# Patient Record
Sex: FEMALE | Race: WHITE | Employment: UNEMPLOYED | URBAN - METROPOLITAN AREA
[De-identification: names, ages, dates, MRNs, and addresses within clinical notes are randomized per-mention and may not be internally consistent; named-entity substitution may affect disease eponyms.]

---

## 2017-01-26 ENCOUNTER — OFFICE VISIT (OUTPATIENT)
Dept: INTERNAL MEDICINE CLINIC | Age: 52
End: 2017-01-26

## 2017-01-26 ENCOUNTER — HOSPITAL ENCOUNTER (OUTPATIENT)
Dept: OTHER | Age: 52
Discharge: HOME | End: 2017-01-26
Attending: INTERNAL MEDICINE | Admitting: INTERNAL MEDICINE

## 2017-01-26 VITALS
HEART RATE: 80 BPM | SYSTOLIC BLOOD PRESSURE: 130 MMHG | DIASTOLIC BLOOD PRESSURE: 82 MMHG | OXYGEN SATURATION: 99 % | BODY MASS INDEX: 35.99 KG/M2 | WEIGHT: 243 LBS | HEIGHT: 69 IN

## 2017-01-26 DIAGNOSIS — R06.02 SHORTNESS OF BREATH: Primary | ICD-10-CM

## 2017-01-26 DIAGNOSIS — R06.02 SHORTNESS OF BREATH: ICD-10-CM

## 2017-01-26 LAB
ANION GAP SERPL CALCULATED.3IONS-SCNC: 18 MMOL/L (ref 3–16)
BASOPHILS ABSOLUTE: 0 K/UL (ref 0–0.2)
BASOPHILS RELATIVE PERCENT: 0.5 %
BUN BLDV-MCNC: 12 MG/DL (ref 7–20)
CALCIUM SERPL-MCNC: 9.5 MG/DL (ref 8.3–10.6)
CHLORIDE BLD-SCNC: 96 MMOL/L (ref 99–110)
CO2: 25 MMOL/L (ref 21–32)
CREAT SERPL-MCNC: 0.6 MG/DL (ref 0.6–1.1)
D DIMER: <200 NG/ML DDU (ref 0–229)
EOSINOPHILS ABSOLUTE: 0.2 K/UL (ref 0–0.6)
EOSINOPHILS RELATIVE PERCENT: 2 %
GFR AFRICAN AMERICAN: >60
GFR NON-AFRICAN AMERICAN: >60
GLUCOSE BLD-MCNC: 83 MG/DL (ref 70–99)
HCT VFR BLD CALC: 37.7 % (ref 36–48)
HEMOGLOBIN: 12.5 G/DL (ref 12–16)
LYMPHOCYTES ABSOLUTE: 2.2 K/UL (ref 1–5.1)
LYMPHOCYTES RELATIVE PERCENT: 29 %
MCH RBC QN AUTO: 29.6 PG (ref 26–34)
MCHC RBC AUTO-ENTMCNC: 33.1 G/DL (ref 31–36)
MCV RBC AUTO: 89.5 FL (ref 80–100)
MONOCYTES ABSOLUTE: 0.4 K/UL (ref 0–1.3)
MONOCYTES RELATIVE PERCENT: 5.3 %
NEUTROPHILS ABSOLUTE: 4.9 K/UL (ref 1.7–7.7)
NEUTROPHILS RELATIVE PERCENT: 63.2 %
PDW BLD-RTO: 12.8 % (ref 12.4–15.4)
PLATELET # BLD: 318 K/UL (ref 135–450)
PMV BLD AUTO: 8.3 FL (ref 5–10.5)
POTASSIUM SERPL-SCNC: 4.1 MMOL/L (ref 3.5–5.1)
RBC # BLD: 4.21 M/UL (ref 4–5.2)
SODIUM BLD-SCNC: 139 MMOL/L (ref 136–145)
WBC # BLD: 7.7 K/UL (ref 4–11)

## 2017-01-26 PROCEDURE — 1036F TOBACCO NON-USER: CPT | Performed by: INTERNAL MEDICINE

## 2017-01-26 PROCEDURE — 99213 OFFICE O/P EST LOW 20 MIN: CPT | Performed by: INTERNAL MEDICINE

## 2017-01-26 PROCEDURE — 3014F SCREEN MAMMO DOC REV: CPT | Performed by: INTERNAL MEDICINE

## 2017-01-26 PROCEDURE — G8427 DOCREV CUR MEDS BY ELIG CLIN: HCPCS | Performed by: INTERNAL MEDICINE

## 2017-01-26 PROCEDURE — 93000 ELECTROCARDIOGRAM COMPLETE: CPT | Performed by: INTERNAL MEDICINE

## 2017-01-26 PROCEDURE — G8419 CALC BMI OUT NRM PARAM NOF/U: HCPCS | Performed by: INTERNAL MEDICINE

## 2017-01-26 PROCEDURE — G8484 FLU IMMUNIZE NO ADMIN: HCPCS | Performed by: INTERNAL MEDICINE

## 2017-01-26 RX ORDER — LOSARTAN POTASSIUM 50 MG/1
TABLET ORAL
Qty: 30 TABLET | Refills: 3 | Status: CANCELLED | OUTPATIENT
Start: 2017-01-26

## 2017-01-26 ASSESSMENT — ENCOUNTER SYMPTOMS
BLOOD IN STOOL: 0
VOMITING: 0
DIARRHEA: 0
CHEST TIGHTNESS: 0
WHEEZING: 0
ABDOMINAL PAIN: 0
NAUSEA: 0
COUGH: 0
SHORTNESS OF BREATH: 1

## 2017-02-06 RX ORDER — LOSARTAN POTASSIUM 50 MG/1
TABLET ORAL
Qty: 30 TABLET | Refills: 2 | Status: SHIPPED | OUTPATIENT
Start: 2017-02-06 | End: 2017-05-14 | Stop reason: SDUPTHER

## 2017-04-24 ENCOUNTER — OFFICE VISIT (OUTPATIENT)
Dept: INTERNAL MEDICINE CLINIC | Age: 52
End: 2017-04-24

## 2017-04-24 ENCOUNTER — HOSPITAL ENCOUNTER (OUTPATIENT)
Dept: OTHER | Age: 52
Discharge: HOME | End: 2017-04-24
Attending: NURSE PRACTITIONER | Admitting: NURSE PRACTITIONER

## 2017-04-24 VITALS
SYSTOLIC BLOOD PRESSURE: 138 MMHG | HEIGHT: 69 IN | BODY MASS INDEX: 36.73 KG/M2 | WEIGHT: 248 LBS | DIASTOLIC BLOOD PRESSURE: 80 MMHG | RESPIRATION RATE: 14 BRPM | HEART RATE: 80 BPM

## 2017-04-24 DIAGNOSIS — M79.604 LOWER EXTREMITY PAIN, RIGHT: Primary | ICD-10-CM

## 2017-04-24 LAB
ANION GAP SERPL CALCULATED.3IONS-SCNC: 11 MMOL/L (ref 3–16)
BUN BLDV-MCNC: 12 MG/DL (ref 7–20)
CALCIUM SERPL-MCNC: 9.4 MG/DL (ref 8.3–10.6)
CHLORIDE BLD-SCNC: 98 MMOL/L (ref 99–110)
CO2: 30 MMOL/L (ref 21–32)
CREAT SERPL-MCNC: 0.7 MG/DL (ref 0.6–1.1)
D DIMER: <200 NG/ML DDU (ref 0–229)
GFR AFRICAN AMERICAN: >60
GFR NON-AFRICAN AMERICAN: >60
GLUCOSE BLD-MCNC: 118 MG/DL (ref 70–99)
POTASSIUM SERPL-SCNC: 3.7 MMOL/L (ref 3.5–5.1)
SODIUM BLD-SCNC: 139 MMOL/L (ref 136–145)

## 2017-04-24 PROCEDURE — G8417 CALC BMI ABV UP PARAM F/U: HCPCS | Performed by: NURSE PRACTITIONER

## 2017-04-24 PROCEDURE — G8427 DOCREV CUR MEDS BY ELIG CLIN: HCPCS | Performed by: NURSE PRACTITIONER

## 2017-04-24 PROCEDURE — 3014F SCREEN MAMMO DOC REV: CPT | Performed by: NURSE PRACTITIONER

## 2017-04-24 PROCEDURE — 3017F COLORECTAL CA SCREEN DOC REV: CPT | Performed by: NURSE PRACTITIONER

## 2017-04-24 PROCEDURE — 1036F TOBACCO NON-USER: CPT | Performed by: NURSE PRACTITIONER

## 2017-04-24 PROCEDURE — 99214 OFFICE O/P EST MOD 30 MIN: CPT | Performed by: NURSE PRACTITIONER

## 2017-04-24 ASSESSMENT — ENCOUNTER SYMPTOMS
CHEST TIGHTNESS: 0
VOMITING: 0
NAUSEA: 0
COUGH: 0
RHINORRHEA: 0
ABDOMINAL PAIN: 0
SHORTNESS OF BREATH: 1
DIARRHEA: 0

## 2017-06-19 RX ORDER — LOSARTAN POTASSIUM 50 MG/1
TABLET ORAL
Qty: 30 TABLET | Refills: 0 | Status: SHIPPED | OUTPATIENT
Start: 2017-06-19 | End: 2017-07-19 | Stop reason: SDUPTHER

## 2017-07-19 RX ORDER — LOSARTAN POTASSIUM 50 MG/1
TABLET ORAL
Qty: 30 TABLET | Refills: 0 | Status: SHIPPED | OUTPATIENT
Start: 2017-07-19 | End: 2017-07-26 | Stop reason: SDUPTHER

## 2017-07-26 ENCOUNTER — OFFICE VISIT (OUTPATIENT)
Dept: INTERNAL MEDICINE CLINIC | Age: 52
End: 2017-07-26

## 2017-07-26 VITALS
HEART RATE: 80 BPM | HEIGHT: 69 IN | BODY MASS INDEX: 37.03 KG/M2 | WEIGHT: 250 LBS | DIASTOLIC BLOOD PRESSURE: 80 MMHG | SYSTOLIC BLOOD PRESSURE: 132 MMHG

## 2017-07-26 DIAGNOSIS — E66.9 NON MORBID OBESITY, UNSPECIFIED OBESITY TYPE: ICD-10-CM

## 2017-07-26 DIAGNOSIS — R06.02 SHORTNESS OF BREATH: ICD-10-CM

## 2017-07-26 DIAGNOSIS — I10 ESSENTIAL HYPERTENSION: Primary | ICD-10-CM

## 2017-07-26 DIAGNOSIS — M79.89 RIGHT LEG SWELLING: ICD-10-CM

## 2017-07-26 PROCEDURE — 3014F SCREEN MAMMO DOC REV: CPT | Performed by: INTERNAL MEDICINE

## 2017-07-26 PROCEDURE — G8427 DOCREV CUR MEDS BY ELIG CLIN: HCPCS | Performed by: INTERNAL MEDICINE

## 2017-07-26 PROCEDURE — 1036F TOBACCO NON-USER: CPT | Performed by: INTERNAL MEDICINE

## 2017-07-26 PROCEDURE — 99214 OFFICE O/P EST MOD 30 MIN: CPT | Performed by: INTERNAL MEDICINE

## 2017-07-26 PROCEDURE — G8417 CALC BMI ABV UP PARAM F/U: HCPCS | Performed by: INTERNAL MEDICINE

## 2017-07-26 PROCEDURE — 3017F COLORECTAL CA SCREEN DOC REV: CPT | Performed by: INTERNAL MEDICINE

## 2017-07-26 RX ORDER — LOSARTAN POTASSIUM 50 MG/1
TABLET ORAL
Qty: 30 TABLET | Refills: 2 | Status: SHIPPED | OUTPATIENT
Start: 2017-07-26 | End: 2017-11-21 | Stop reason: SDUPTHER

## 2017-07-26 ASSESSMENT — ENCOUNTER SYMPTOMS
CHEST TIGHTNESS: 0
COUGH: 0
WHEEZING: 0
SHORTNESS OF BREATH: 0
VOMITING: 0
ABDOMINAL PAIN: 0
BLOOD IN STOOL: 0
DIARRHEA: 0
NAUSEA: 0

## 2017-08-03 ENCOUNTER — HOSPITAL ENCOUNTER (OUTPATIENT)
Dept: ULTRASOUND IMAGING | Age: 52
Discharge: HOME | End: 2017-08-03
Attending: INTERNAL MEDICINE | Admitting: INTERNAL MEDICINE

## 2017-08-03 DIAGNOSIS — M79.89 RIGHT LEG SWELLING: ICD-10-CM

## 2017-12-13 ENCOUNTER — OFFICE VISIT (OUTPATIENT)
Dept: INTERNAL MEDICINE CLINIC | Age: 52
End: 2017-12-13

## 2017-12-13 VITALS
SYSTOLIC BLOOD PRESSURE: 140 MMHG | HEIGHT: 69 IN | BODY MASS INDEX: 37.18 KG/M2 | HEART RATE: 76 BPM | WEIGHT: 251 LBS | DIASTOLIC BLOOD PRESSURE: 82 MMHG

## 2017-12-13 DIAGNOSIS — I10 ESSENTIAL HYPERTENSION: Primary | ICD-10-CM

## 2017-12-13 DIAGNOSIS — R09.89 BRUIT OF LEFT CAROTID ARTERY: ICD-10-CM

## 2017-12-13 PROCEDURE — 1036F TOBACCO NON-USER: CPT | Performed by: INTERNAL MEDICINE

## 2017-12-13 PROCEDURE — 3017F COLORECTAL CA SCREEN DOC REV: CPT | Performed by: INTERNAL MEDICINE

## 2017-12-13 PROCEDURE — G8484 FLU IMMUNIZE NO ADMIN: HCPCS | Performed by: INTERNAL MEDICINE

## 2017-12-13 PROCEDURE — G8427 DOCREV CUR MEDS BY ELIG CLIN: HCPCS | Performed by: INTERNAL MEDICINE

## 2017-12-13 PROCEDURE — 3014F SCREEN MAMMO DOC REV: CPT | Performed by: INTERNAL MEDICINE

## 2017-12-13 PROCEDURE — 99213 OFFICE O/P EST LOW 20 MIN: CPT | Performed by: INTERNAL MEDICINE

## 2017-12-13 PROCEDURE — G8417 CALC BMI ABV UP PARAM F/U: HCPCS | Performed by: INTERNAL MEDICINE

## 2017-12-13 ASSESSMENT — ENCOUNTER SYMPTOMS
DIARRHEA: 0
WHEEZING: 0
NAUSEA: 0
COUGH: 0
ABDOMINAL PAIN: 0
SHORTNESS OF BREATH: 0
CHEST TIGHTNESS: 0
BLOOD IN STOOL: 0
VOMITING: 0

## 2017-12-18 RX ORDER — LOSARTAN POTASSIUM 50 MG/1
TABLET ORAL
Qty: 30 TABLET | Refills: 0 | Status: SHIPPED | OUTPATIENT
Start: 2017-12-18 | End: 2018-01-05 | Stop reason: SDUPTHER

## 2017-12-20 ENCOUNTER — HOSPITAL ENCOUNTER (OUTPATIENT)
Dept: ULTRASOUND IMAGING | Age: 52
Discharge: HOME | End: 2017-12-20
Admitting: INTERNAL MEDICINE

## 2017-12-20 DIAGNOSIS — R09.89 OTHER SPECIFIED SYMPTOMS AND SIGNS INVOLVING THE CIRCULATORY AND RESPIRATORY SYSTEMS: ICD-10-CM

## 2017-12-20 DIAGNOSIS — R09.89 BRUIT OF LEFT CAROTID ARTERY: ICD-10-CM

## 2018-01-05 ENCOUNTER — OFFICE VISIT (OUTPATIENT)
Dept: INTERNAL MEDICINE CLINIC | Age: 53
End: 2018-01-05

## 2018-01-05 VITALS
WEIGHT: 250 LBS | DIASTOLIC BLOOD PRESSURE: 84 MMHG | SYSTOLIC BLOOD PRESSURE: 136 MMHG | BODY MASS INDEX: 37.03 KG/M2 | HEIGHT: 69 IN | HEART RATE: 72 BPM

## 2018-01-05 DIAGNOSIS — E66.9 NON MORBID OBESITY: ICD-10-CM

## 2018-01-05 DIAGNOSIS — I10 ESSENTIAL HYPERTENSION: Primary | ICD-10-CM

## 2018-01-05 PROCEDURE — 3014F SCREEN MAMMO DOC REV: CPT | Performed by: INTERNAL MEDICINE

## 2018-01-05 PROCEDURE — 1036F TOBACCO NON-USER: CPT | Performed by: INTERNAL MEDICINE

## 2018-01-05 PROCEDURE — G8417 CALC BMI ABV UP PARAM F/U: HCPCS | Performed by: INTERNAL MEDICINE

## 2018-01-05 PROCEDURE — G8427 DOCREV CUR MEDS BY ELIG CLIN: HCPCS | Performed by: INTERNAL MEDICINE

## 2018-01-05 PROCEDURE — 3017F COLORECTAL CA SCREEN DOC REV: CPT | Performed by: INTERNAL MEDICINE

## 2018-01-05 PROCEDURE — 99213 OFFICE O/P EST LOW 20 MIN: CPT | Performed by: INTERNAL MEDICINE

## 2018-01-05 PROCEDURE — G8484 FLU IMMUNIZE NO ADMIN: HCPCS | Performed by: INTERNAL MEDICINE

## 2018-01-05 RX ORDER — LOSARTAN POTASSIUM 50 MG/1
TABLET ORAL
Qty: 30 TABLET | Refills: 3 | Status: SHIPPED | OUTPATIENT
Start: 2018-01-05 | End: 2018-05-04 | Stop reason: SDUPTHER

## 2018-01-05 ASSESSMENT — ENCOUNTER SYMPTOMS
ABDOMINAL PAIN: 0
NAUSEA: 0
COUGH: 0
VOMITING: 0
SHORTNESS OF BREATH: 0
BLOOD IN STOOL: 0
CHEST TIGHTNESS: 0
WHEEZING: 0
DIARRHEA: 0

## 2018-03-06 ENCOUNTER — HOSPITAL ENCOUNTER (OUTPATIENT)
Dept: OTHER | Age: 53
Discharge: HOME | End: 2018-03-06
Attending: INTERNAL MEDICINE | Admitting: INTERNAL MEDICINE

## 2018-03-06 VITALS
TEMPERATURE: 97.5 F | OXYGEN SATURATION: 98 % | HEART RATE: 69 BPM | DIASTOLIC BLOOD PRESSURE: 79 MMHG | BODY MASS INDEX: 37.03 KG/M2 | SYSTOLIC BLOOD PRESSURE: 137 MMHG | WEIGHT: 250 LBS | HEIGHT: 69 IN | RESPIRATION RATE: 17 BRPM

## 2018-03-06 LAB — PREGNANCY, URINE: NEGATIVE

## 2018-03-06 RX ORDER — SODIUM CHLORIDE, SODIUM LACTATE, POTASSIUM CHLORIDE, CALCIUM CHLORIDE 600; 310; 30; 20 MG/100ML; MG/100ML; MG/100ML; MG/100ML
INJECTION, SOLUTION INTRAVENOUS ONCE
Status: COMPLETED | OUTPATIENT
Start: 2018-03-06 | End: 2018-03-06

## 2018-03-06 RX ADMIN — SODIUM CHLORIDE, SODIUM LACTATE, POTASSIUM CHLORIDE, CALCIUM CHLORIDE: 600; 310; 30; 20 INJECTION, SOLUTION INTRAVENOUS at 07:43

## 2018-03-06 NOTE — PLAN OF CARE
ENDOSCOPY  PRE, INTRA, & POST PROCEDURAL  CARE PLAN    HEMODYNAMIC STATUS  INTERDISCIPLINARY   Goal: Hemodynamic Status to Baseline / Discharge Criteria Met  Interventions     1. Obtain Patient  Medical /  Surgical History     1 Assess & Review Allergies Prior to Endo & All  Meds (PRN)     2. Assess / Monitor Vital Signs / LOC (PRN). Intra Procedure VS/ LOC  Q5 Mins  & As Per Policy Until Discharge. 3. Obtain Baseline Lucas & Post Procedural  Lucas Per   Policy     4. Check Monitors, Alarms On     5. Patient Re Assessed by Physician     6. Monitor  I&O Post Procedure   NURSING   SAFETY & PSYCHO SOCIAL  INTERDISCIPLINARY   Goal: Patient Returns to Baseline Activity/ Meets Discharge Criteria  Interventions     1. Greet Patient with ID Badge/ Picture in View (PRN)     2. Be Available & Sensitive to Patients Needs (PRN)     3. Communicate referral to Pastoral Care as Appropriate (PRN)     4. Provide Age Specific Measures (PRN)     4. Admission Data Base Reviewed     5. Administer Meds Per Orders (PRN)     5. Maintain Baseline Activity  Or Activity as Ordered Per Physician     NUTRITION   INTERDISCIPLINARY   Goal: Patient to baseline/ Improved Nutrition  Interventions     1. Assess NPO Status / Notify Physician if Necessary (PRN)     2. NPO per Physician Orders     3. Assess Nutritional Status (PRN)     4. Assess Ability to Swallow as Indicated     LAB & DIAGNOSTICS   Goal: Additional Tests per Physicians   Orders  Interventions     1. Lab & Diagnostics per Physician Orders (PRN)     2.  Obtain  Urine / Serum HCG & FSBS On All Diabetic Patients  Per Policy & (PRN)     3. Assess Lab Time Out  for  Patient Safety as Needed (PRN)   RESPIRATORY  INTERDISCIPLINARY   Goal: Airway   Patency, SAO2   Saturation, Cough mechanism Maintained   Interventions     1. Evaluate Bilateral Breath Sounds  Baseline, (PRN), & Prior to Discharge     2. Weight & Height Noted ( PRN)     3.   Assess Baseline SPo2 (PRN)

## 2018-03-06 NOTE — OP NOTE
Alycia Pérez   3/6/2018  Colonoscopy  A pre-procedure re-evaluation was performed immediately prior to the procedure.   Preprocedure Dx: screening  Postprocedure Dx: normal  Medications: Procedural sedation with Versed & Fentanyl  Complications: None  Estimated Blood Loss: <5cc  Specimens: were obtained  Recommendations: await path  Bart Patel

## 2018-03-27 ENCOUNTER — OFFICE VISIT (OUTPATIENT)
Dept: INTERNAL MEDICINE CLINIC | Age: 53
End: 2018-03-27

## 2018-03-27 VITALS
WEIGHT: 245 LBS | DIASTOLIC BLOOD PRESSURE: 75 MMHG | HEART RATE: 72 BPM | SYSTOLIC BLOOD PRESSURE: 135 MMHG | HEIGHT: 69 IN | BODY MASS INDEX: 36.29 KG/M2 | TEMPERATURE: 97.8 F

## 2018-03-27 DIAGNOSIS — J20.9 ACUTE BRONCHITIS, UNSPECIFIED ORGANISM: Primary | ICD-10-CM

## 2018-03-27 PROCEDURE — G8484 FLU IMMUNIZE NO ADMIN: HCPCS | Performed by: PHYSICIAN ASSISTANT

## 2018-03-27 PROCEDURE — 1036F TOBACCO NON-USER: CPT | Performed by: PHYSICIAN ASSISTANT

## 2018-03-27 PROCEDURE — 99213 OFFICE O/P EST LOW 20 MIN: CPT | Performed by: PHYSICIAN ASSISTANT

## 2018-03-27 PROCEDURE — G8417 CALC BMI ABV UP PARAM F/U: HCPCS | Performed by: PHYSICIAN ASSISTANT

## 2018-03-27 PROCEDURE — G8427 DOCREV CUR MEDS BY ELIG CLIN: HCPCS | Performed by: PHYSICIAN ASSISTANT

## 2018-03-27 PROCEDURE — 3017F COLORECTAL CA SCREEN DOC REV: CPT | Performed by: PHYSICIAN ASSISTANT

## 2018-03-27 PROCEDURE — 3014F SCREEN MAMMO DOC REV: CPT | Performed by: PHYSICIAN ASSISTANT

## 2018-03-27 RX ORDER — DOXYCYCLINE HYCLATE 100 MG
100 TABLET ORAL 2 TIMES DAILY
Qty: 10 TABLET | Refills: 0 | Status: SHIPPED | OUTPATIENT
Start: 2018-03-27 | End: 2018-04-01

## 2018-03-27 ASSESSMENT — ENCOUNTER SYMPTOMS
SORE THROAT: 0
NAUSEA: 0
ABDOMINAL PAIN: 0
COUGH: 1
SHORTNESS OF BREATH: 0
RHINORRHEA: 1
VOMITING: 0

## 2018-03-27 NOTE — PROGRESS NOTES
Chief Complaint   Patient presents with    Cough        HPI  Lalo Henry is a 47 y.o. female who presents for evaluation of cough. She had influenza A at the beginning of March. Acute flu symptoms resolved but she has had a cough for about 3 weeks. The cough is not productive. She has tried nyquil OTC. No current fevers or chills     Nonsmoker. No CP and no SOB      Review of Systems   Constitutional: Negative for chills and fever. HENT: Positive for congestion, postnasal drip and rhinorrhea. Negative for sore throat. Respiratory: Positive for cough. Negative for shortness of breath. Cardiovascular: Negative for chest pain. Gastrointestinal: Negative for abdominal pain, nausea and vomiting. Allergies  Codeine      Vitals  /75   Pulse 72   Temp 97.8 °F (36.6 °C)   Ht 5' 9\" (1.753 m)   Wt 245 lb (111.1 kg)   LMP 12/06/2017   BMI 36.18 kg/m²     Current Medications  Current Outpatient Prescriptions   Medication Sig Dispense Refill    doxycycline hyclate (VIBRA-TABS) 100 MG tablet Take 1 tablet by mouth 2 times daily for 5 days 10 tablet 0    losartan (COZAAR) 50 MG tablet TAKE ONE TABLET BY MOUTH DAILY 30 tablet 3     No current facility-administered medications for this visit. Past Medical History  Past Medical History:   Diagnosis Date    Bursitis     colonoscopy 03/06/2018    Dyspnea     GERD (gastroesophageal reflux disease)     Hypertension        Social History  Social History     Social History    Marital status:      Spouse name: N/A    Number of children: N/A    Years of education: N/A     Occupational History    Not on file.      Social History Main Topics    Smoking status: Never Smoker    Smokeless tobacco: Never Used    Alcohol use No    Drug use: No    Sexual activity: Not on file     Other Topics Concern    Not on file     Social History Narrative    No narrative on file       Surgical History  Past Surgical History:   Procedure Laterality Date    CHOLECYSTECTOMY      COLONOSCOPY  03/06/2018       Physical Exam   Constitutional: She is oriented to person, place, and time. She appears well-developed and well-nourished. HENT:   Head: Normocephalic and atraumatic. Eyes: Conjunctivae are normal. Right eye exhibits no discharge. Left eye exhibits no discharge. Neck: Normal range of motion. Neck supple. Cardiovascular: Normal rate and regular rhythm. Pulmonary/Chest: Effort normal and breath sounds normal.   Musculoskeletal: Normal range of motion. Neurological: She is alert and oriented to person, place, and time. Skin: Skin is warm and dry. Psychiatric: She has a normal mood and affect. Vitals reviewed. Assessment/Plan     1. Acute bronchitis, unspecified organism  - doxycycline hyclate (VIBRA-TABS) 100 MG tablet; Take 1 tablet by mouth 2 times daily for 5 days  Dispense: 10 tablet;  Refill: 0  - mucinex  - call if no improvement     Liliana Hurtado PA-C  3/27/2018

## 2018-03-27 NOTE — PATIENT INSTRUCTIONS
discoloration later in the baby's life. What is doxycycline? Doxycycline is a tetracycline antibiotic that fights bacteria in the body. Doxycycline is used to treat many different bacterial infections, such as acne, urinary tract infections, intestinal infections, eye infections, gonorrhea, chlamydia, periodontitis (gum disease), and others. Doxycycline is also used to treat blemishes, bumps, and acne-like lesions caused by rosacea. Doxycycline will not treat facial redness caused by rosacea. Some forms of doxycycline are used to prevent malaria, to treat anthrax, or to treat infections caused by mites, ticks, or lice. Doxycycline may also be used for purposes not listed in this medication guide. What should I discuss with my healthcare provider before taking doxycycline? You should not take this medicine if you are allergic to doxycycline or other tetracycline antibiotics such as demeclocycline, minocycline, tetracycline, or tigecycline. To make sure doxycycline is safe for you, tell your doctor if you have:  · liver disease;  · kidney disease;  · asthma or sulfite allergy;  · a history of increased pressure inside your skull;  · if you also take isotretinoin (Amnesteem, Claravis, Sotret); or  · if you take seizure medicine (carbamazepine, phenobarbital, phenytoin), or a blood thinner (warfarin, Coumadin, Roby Fair). If you are using doxycycline to treat gonorrhea, your doctor may test you to make sure you do not also have syphilis, another sexually transmitted disease. Taking this medicine during pregnancy may affect tooth and bone development in the unborn baby. Taking doxycycline during the last half of pregnancy can cause permanent tooth discoloration later in the baby's life. Tell your doctor if you are pregnant or if you become pregnant while using this medicine. Doxycycline can make birth control pills less effective.  Ask your doctor about using a non-hormonal birth control (condom, diaphragm with spermicide) to prevent pregnancy. Doxycycline can pass into breast milk and may affect bone and tooth development in a nursing infant. Do not breast-feed while you are taking doxycycline. Doxycycline can cause permanent yellowing or graying of the teeth in children younger than 6years old. Children should use doxycycline only in cases of severe or life-threatening conditions such as anthrax or [facility] spotted fever. The benefit of treating a serious condition may outweigh any risks to the child's tooth development. How should I take doxycycline? Follow all directions on your prescription label. Do not take this medicine in larger or smaller amounts or for longer than recommended. Take doxycycline with a full glass of water. Drink plenty of liquids while you are taking doxycycline. Most brands of doxycyline may be taken with food or milk if the medicine upsets your stomach. Different brands of doxycycline may have different instructions about taking them with or without food. Take Oracea on an empty stomach, at least 1 hour before or 2 hours after a meal.  You may open a Doryx capsule or break up a Doryx tablet and sprinkle the medicine into a spoonful of applesauce to make swallowing easier. Swallow right away without chewing. Do not save the mixture for later use. Drink a full glass (8 ounces) of cool water right away. Do not crush, break, or open a delayed-release capsule or tablet. Swallow the pill whole. You may need to split the Acticlate tablet to get the correct dose. The tablet is scored so you can break it apart easily. Measure liquid medicine  with the dosing syringe provided, or with a special dose-measuring spoon or medicine cup. If you do not have a dose-measuring device, ask your pharmacist for one. If you take doxycycline to prevent malaria: Start taking the medicine 1 or 2 days before entering an area where malaria is common.  Continue taking the medicine every day during your medication only for the indication prescribed. Every effort has been made to ensure that the information provided by [address] is accurate, up-to-date, and complete, but no guarantee is made to that effect. Drug information contained herein may be time sensitive. [facility] information has been compiled for use by healthcare practitioners and consumers in the [place] and therefore [facility] does not warrant that uses outside of the [place] are appropriate, unless specifically indicated otherwise. [facility]'s drug information does not endorse drugs, diagnose patients or recommend therapy. [facility] drug information is an informational resource designed to assist licensed healthcare practitioners in caring for their patients and/or to serve consumers viewing this service as a supplement to, and not a substitute for, the expertise, skill, knowledge and judgment of healthcare practitioners. The absence of a warning for a given drug or drug combination in no way should be construed to indicate that the drug or drug combination is safe, effective or appropriate for any given patient. [facility] does not assume any responsibility for any aspect of healthcare administered with the aid of information [facility] provides. The information contained herein is not intended to cover all possible uses, directions, precautions, warnings, drug interactions, allergic reactions, or adverse effects. If you have questions about the drugs you are taking, check with your doctor, nurse or pharmacist.  Copyright 3697-5388 [address]. Version: 19.11. Revision date: 1/4/2017. Care instructions adapted under license by [facility] ([facility]). If you have questions about a medical condition or this instruction, always ask your healthcare professional. [place] [address] any warranty or liability for your use of this information.

## 2018-05-04 ENCOUNTER — OFFICE VISIT (OUTPATIENT)
Dept: INTERNAL MEDICINE CLINIC | Age: 53
End: 2018-05-04

## 2018-05-04 VITALS
WEIGHT: 242 LBS | SYSTOLIC BLOOD PRESSURE: 130 MMHG | HEART RATE: 76 BPM | DIASTOLIC BLOOD PRESSURE: 82 MMHG | BODY MASS INDEX: 35.84 KG/M2 | HEIGHT: 69 IN

## 2018-05-04 DIAGNOSIS — Z00.00 ROUTINE GENERAL MEDICAL EXAMINATION AT A HEALTH CARE FACILITY: ICD-10-CM

## 2018-05-04 DIAGNOSIS — Z00.00 ROUTINE GENERAL MEDICAL EXAMINATION AT A HEALTH CARE FACILITY: Primary | ICD-10-CM

## 2018-05-04 DIAGNOSIS — E66.9 NON MORBID OBESITY: ICD-10-CM

## 2018-05-04 DIAGNOSIS — I10 ESSENTIAL HYPERTENSION: ICD-10-CM

## 2018-05-04 DIAGNOSIS — Z12.31 SCREENING MAMMOGRAM, ENCOUNTER FOR: ICD-10-CM

## 2018-05-04 LAB
A/G RATIO: 1.5 (ref 1.1–2.2)
ALBUMIN SERPL-MCNC: 4.3 G/DL (ref 3.4–5)
ALP BLD-CCNC: 106 U/L (ref 40–129)
ALT SERPL-CCNC: 17 U/L (ref 10–40)
ANION GAP SERPL CALCULATED.3IONS-SCNC: 16 MMOL/L (ref 3–16)
AST SERPL-CCNC: 17 U/L (ref 15–37)
BASOPHILS ABSOLUTE: 0 K/UL (ref 0–0.2)
BASOPHILS RELATIVE PERCENT: 0.5 %
BILIRUB SERPL-MCNC: 0.7 MG/DL (ref 0–1)
BUN BLDV-MCNC: 14 MG/DL (ref 7–20)
CALCIUM SERPL-MCNC: 9.2 MG/DL (ref 8.3–10.6)
CHLORIDE BLD-SCNC: 101 MMOL/L (ref 99–110)
CHOLESTEROL, TOTAL: 186 MG/DL (ref 0–199)
CO2: 22 MMOL/L (ref 21–32)
CREAT SERPL-MCNC: 0.6 MG/DL (ref 0.6–1.1)
EOSINOPHILS ABSOLUTE: 0.1 K/UL (ref 0–0.6)
EOSINOPHILS RELATIVE PERCENT: 1.8 %
GFR AFRICAN AMERICAN: >60
GFR NON-AFRICAN AMERICAN: >60
GLOBULIN: 2.9 G/DL
GLUCOSE BLD-MCNC: 103 MG/DL (ref 70–99)
HCT VFR BLD CALC: 35.8 % (ref 36–48)
HDLC SERPL-MCNC: 60 MG/DL (ref 40–60)
HEMOGLOBIN: 11.8 G/DL (ref 12–16)
HEPATITIS C ANTIBODY INTERPRETATION: NORMAL
LDL CHOLESTEROL CALCULATED: 106 MG/DL
LYMPHOCYTES ABSOLUTE: 1.7 K/UL (ref 1–5.1)
LYMPHOCYTES RELATIVE PERCENT: 26.8 %
MCH RBC QN AUTO: 30.4 PG (ref 26–34)
MCHC RBC AUTO-ENTMCNC: 33.1 G/DL (ref 31–36)
MCV RBC AUTO: 91.9 FL (ref 80–100)
MONOCYTES ABSOLUTE: 0.3 K/UL (ref 0–1.3)
MONOCYTES RELATIVE PERCENT: 5.1 %
NEUTROPHILS ABSOLUTE: 4.1 K/UL (ref 1.7–7.7)
NEUTROPHILS RELATIVE PERCENT: 65.8 %
PDW BLD-RTO: 14.2 % (ref 12.4–15.4)
PLATELET # BLD: 306 K/UL (ref 135–450)
PMV BLD AUTO: 8.9 FL (ref 5–10.5)
POTASSIUM SERPL-SCNC: 4.5 MMOL/L (ref 3.5–5.1)
RBC # BLD: 3.89 M/UL (ref 4–5.2)
SODIUM BLD-SCNC: 139 MMOL/L (ref 136–145)
TOTAL PROTEIN: 7.2 G/DL (ref 6.4–8.2)
TRIGL SERPL-MCNC: 99 MG/DL (ref 0–150)
VLDLC SERPL CALC-MCNC: 20 MG/DL
WBC # BLD: 6.2 K/UL (ref 4–11)

## 2018-05-04 PROCEDURE — 99396 PREV VISIT EST AGE 40-64: CPT | Performed by: INTERNAL MEDICINE

## 2018-05-04 RX ORDER — LOSARTAN POTASSIUM 50 MG/1
TABLET ORAL
Qty: 30 TABLET | Refills: 5 | Status: SHIPPED | OUTPATIENT
Start: 2018-05-04 | End: 2018-11-15 | Stop reason: SDUPTHER

## 2018-05-04 ASSESSMENT — ENCOUNTER SYMPTOMS
NAUSEA: 0
WHEEZING: 0
VOMITING: 0
SHORTNESS OF BREATH: 0
COUGH: 0
CHEST TIGHTNESS: 0
ABDOMINAL PAIN: 0
DIARRHEA: 0
BLOOD IN STOOL: 0

## 2018-05-05 LAB
ESTIMATED AVERAGE GLUCOSE: 116.9 MG/DL
HBA1C MFR BLD: 5.7 %

## 2018-09-25 ENCOUNTER — APPOINTMENT (OUTPATIENT)
Dept: GENERAL RADIOLOGY | Age: 53
End: 2018-09-25
Payer: COMMERCIAL

## 2018-09-25 ENCOUNTER — HOSPITAL ENCOUNTER (EMERGENCY)
Age: 53
Discharge: HOME | End: 2018-09-25
Payer: COMMERCIAL

## 2018-09-25 VITALS
BODY MASS INDEX: 37.03 KG/M2 | WEIGHT: 250 LBS | DIASTOLIC BLOOD PRESSURE: 85 MMHG | SYSTOLIC BLOOD PRESSURE: 162 MMHG | HEIGHT: 69 IN | HEART RATE: 71 BPM | RESPIRATION RATE: 14 BRPM | TEMPERATURE: 98.2 F | OXYGEN SATURATION: 100 %

## 2018-09-25 DIAGNOSIS — M79.604 RIGHT LEG PAIN: Primary | ICD-10-CM

## 2018-09-25 LAB
A/G RATIO: 1.3 (ref 1.1–2.2)
ALBUMIN SERPL-MCNC: 4.6 G/DL (ref 3.4–5)
ALP BLD-CCNC: 117 U/L (ref 40–129)
ALT SERPL-CCNC: 27 U/L (ref 10–40)
ANION GAP SERPL CALCULATED.3IONS-SCNC: 12 MMOL/L (ref 3–16)
APTT: 32.9 SEC (ref 26–36)
AST SERPL-CCNC: 23 U/L (ref 15–37)
BASOPHILS ABSOLUTE: 0 K/UL (ref 0–0.2)
BASOPHILS RELATIVE PERCENT: 0.3 %
BILIRUB SERPL-MCNC: 0.5 MG/DL (ref 0–1)
BUN BLDV-MCNC: 11 MG/DL (ref 7–20)
CALCIUM SERPL-MCNC: 9.5 MG/DL (ref 8.3–10.6)
CHLORIDE BLD-SCNC: 99 MMOL/L (ref 99–110)
CO2: 27 MMOL/L (ref 21–32)
CREAT SERPL-MCNC: 0.6 MG/DL (ref 0.6–1.1)
D DIMER: <200 NG/ML DDU (ref 0–229)
EOSINOPHILS ABSOLUTE: 0.2 K/UL (ref 0–0.6)
EOSINOPHILS RELATIVE PERCENT: 1.9 %
GFR AFRICAN AMERICAN: >60
GFR NON-AFRICAN AMERICAN: >60
GLOBULIN: 3.5 G/DL
GLUCOSE BLD-MCNC: 87 MG/DL (ref 70–99)
HCT VFR BLD CALC: 36.2 % (ref 36–48)
HEMOGLOBIN: 12.2 G/DL (ref 12–16)
INR BLD: 1.03 (ref 0.86–1.14)
LYMPHOCYTES ABSOLUTE: 2.6 K/UL (ref 1–5.1)
LYMPHOCYTES RELATIVE PERCENT: 30.8 %
MCH RBC QN AUTO: 30.4 PG (ref 26–34)
MCHC RBC AUTO-ENTMCNC: 33.6 G/DL (ref 31–36)
MCV RBC AUTO: 90.6 FL (ref 80–100)
MONOCYTES ABSOLUTE: 0.4 K/UL (ref 0–1.3)
MONOCYTES RELATIVE PERCENT: 4.9 %
NEUTROPHILS ABSOLUTE: 5.2 K/UL (ref 1.7–7.7)
NEUTROPHILS RELATIVE PERCENT: 62.1 %
PDW BLD-RTO: 13.6 % (ref 12.4–15.4)
PLATELET # BLD: 346 K/UL (ref 135–450)
PMV BLD AUTO: 8.2 FL (ref 5–10.5)
POTASSIUM REFLEX MAGNESIUM: 3.7 MMOL/L (ref 3.5–5.1)
PRO-BNP: 78 PG/ML (ref 0–124)
PROTHROMBIN TIME: 11.7 SEC (ref 9.8–13)
RBC # BLD: 3.99 M/UL (ref 4–5.2)
SODIUM BLD-SCNC: 138 MMOL/L (ref 136–145)
TOTAL PROTEIN: 8.1 G/DL (ref 6.4–8.2)
WBC # BLD: 8.4 K/UL (ref 4–11)

## 2018-09-25 PROCEDURE — 85025 COMPLETE CBC W/AUTO DIFF WBC: CPT

## 2018-09-25 PROCEDURE — 85379 FIBRIN DEGRADATION QUANT: CPT

## 2018-09-25 PROCEDURE — 85610 PROTHROMBIN TIME: CPT

## 2018-09-25 PROCEDURE — 93005 ELECTROCARDIOGRAM TRACING: CPT | Performed by: EMERGENCY MEDICINE

## 2018-09-25 PROCEDURE — 80053 COMPREHEN METABOLIC PANEL: CPT

## 2018-09-25 PROCEDURE — 83880 ASSAY OF NATRIURETIC PEPTIDE: CPT

## 2018-09-25 PROCEDURE — 71046 X-RAY EXAM CHEST 2 VIEWS: CPT

## 2018-09-25 PROCEDURE — 85730 THROMBOPLASTIN TIME PARTIAL: CPT

## 2018-09-25 PROCEDURE — 99283 EMERGENCY DEPT VISIT LOW MDM: CPT

## 2018-09-25 ASSESSMENT — PAIN DESCRIPTION - PROGRESSION: CLINICAL_PROGRESSION: NOT CHANGED

## 2018-09-25 ASSESSMENT — PAIN SCALES - GENERAL
PAINLEVEL_OUTOF10: 0
PAINLEVEL_OUTOF10: 8

## 2018-09-25 ASSESSMENT — PAIN DESCRIPTION - ORIENTATION: ORIENTATION: POSTERIOR;RIGHT

## 2018-09-25 ASSESSMENT — PAIN DESCRIPTION - FREQUENCY: FREQUENCY: CONTINUOUS

## 2018-09-25 ASSESSMENT — PAIN DESCRIPTION - DESCRIPTORS: DESCRIPTORS: CRAMPING

## 2018-09-25 ASSESSMENT — PAIN DESCRIPTION - LOCATION: LOCATION: LEG

## 2018-09-25 ASSESSMENT — PAIN DESCRIPTION - PAIN TYPE: TYPE: ACUTE PAIN

## 2018-09-25 ASSESSMENT — PAIN DESCRIPTION - ONSET: ONSET: ON-GOING

## 2018-09-25 NOTE — ED PROVIDER NOTES
[facility] Emergency Department    CHIEF COMPLAINT  Leg Pain (Pt ambulatory to triage for report of 5 day hx of right calf pain and some SOB . Negative Chastity sign. Sent by urgent care to r/o dvt.)      HISTORY OF PRESENT ILLNESS  Kirti Santa is a 47 y.o. female who presents to the ED complaining of several history of right calf pain and tightness. Patient observed lying in bed, appears nontoxic and in no acute distress at this time. Patient is accompanied by  today for evaluation. Patient reports that she is in the process of moving. Has had some increased activity. Has some mild right knee discomfort. Develop some right calf pain and tightness. Also had some similar symptoms on the left however appeared to resolve rather quickly. She denies any recent trauma, travel, or surgery. No history of blood clots. She uses no blood thinners. She has no complaints of chest pain or shortness of breath. No fevers or chills. No urinary symptoms. No headache, lightheadedness, dizziness or confusion. No other complaints, modifying factors or associated symptoms. Nursing notes reviewed. Past Medical History:   Diagnosis Date    Bursitis     colonoscopy 03/06/2018    Dyspnea     GERD (gastroesophageal reflux disease)     Hypertension      Past Surgical History:   Procedure Laterality Date    CHOLECYSTECTOMY      COLONOSCOPY  03/06/2018     Family History   Problem Relation Age of Onset    Diabetes Father     Cancer Father     Colon Cancer Father     Mental Retardation Sister      Social History     Social History    Marital status:      Spouse name: N/A    Number of children: N/A    Years of education: N/A     Occupational History    Not on file.      Social History Main Topics    Smoking status: Never Smoker    Smokeless tobacco: Never Used    Alcohol use No    Drug use: No    Sexual activity: Not on file     Other Topics Concern    Not on file     Social History Narrative    No narrative on file     No current facility-administered medications for this encounter. Current Outpatient Prescriptions   Medication Sig Dispense Refill    losartan (COZAAR) 50 MG tablet TAKE ONE TABLET BY MOUTH DAILY 30 tablet 5     Allergies   Allergen Reactions    Codeine Hives       REVIEW OF SYSTEMS  10 systems reviewed, pertinent positives per HPI otherwise noted to be negative    PHYSICAL EXAM  BP (!) 189/100   Pulse 73   Temp 98.2 °F (36.8 °C) (Oral)   Resp 15   Ht 5' 9\" (1.753 m)   Wt 250 lb (113.4 kg)   LMP 09/20/2018 (Exact Date)   SpO2 100%   BMI 36.92 kg/m²   GENERAL APPEARANCE: Awake and alert. Cooperative. No acute distress. HEAD: Normocephalic. Atraumatic. EYES: PERRL. EOM's grossly intact. ENT: Mucous membranes are moist.   NECK: Supple. No JVD. No tracheal tenderness or deviation. No crepitus. HEART: RRR. No murmurs. No chest wall tenderness. LUNGS: Respirations unlabored. CTAB. Good air exchange. Speaking comfortably in full sentences. No wheezes, rhonchi, rales. ABDOMEN: Soft. Non-distended. Non-tender. No guarding or rebound. No midline pulsatile mass. EXTREMITIES: No peripheral edema. On exam of the right lower extremity there is mild diffuse soft tissue swelling noted. Tenderness to posterior knee without obvious Baker's cyst.  No appreciable joint effusion. Normal range of motion and strength testing. No overlying redness or warmth. Patellar DTRs +2 bilaterally. There is also some tenderness and calf without palpable cords or masses. Moves all extremities equally. All extremities neurovascularly intact. SKIN: Warm and dry. No acute rashes. NEUROLOGICAL: Alert and oriented. CN's 2-12 intact. No gross facial drooping. Strength 5/5, sensation intact. PSYCHIATRIC: Normal mood and affect. ED COURSE   I have evaluated this patient. Patient declined pain medication while here in the emergency department.

## 2018-09-26 ENCOUNTER — HOSPITAL ENCOUNTER (OUTPATIENT)
Dept: VASCULAR LAB | Age: 53
Discharge: HOME | End: 2018-09-26
Payer: COMMERCIAL

## 2018-09-26 ENCOUNTER — TELEPHONE (OUTPATIENT)
Dept: INTERNAL MEDICINE CLINIC | Age: 53
End: 2018-09-26

## 2018-09-26 DIAGNOSIS — M79.604 RIGHT LEG PAIN: ICD-10-CM

## 2018-09-26 LAB
EKG ATRIAL RATE: 63 BPM
EKG DIAGNOSIS: NORMAL
EKG P AXIS: 52 DEGREES
EKG P-R INTERVAL: 154 MS
EKG Q-T INTERVAL: 424 MS
EKG QRS DURATION: 90 MS
EKG QTC CALCULATION (BAZETT): 433 MS
EKG R AXIS: 8 DEGREES
EKG T AXIS: 14 DEGREES
EKG VENTRICULAR RATE: 63 BPM

## 2018-09-26 PROCEDURE — 93010 ELECTROCARDIOGRAM REPORT: CPT | Performed by: INTERNAL MEDICINE

## 2018-09-26 PROCEDURE — 93971 EXTREMITY STUDY: CPT

## 2018-09-26 NOTE — TELEPHONE ENCOUNTER
----- Message from Desiree Billy MD sent at 9/26/2018  1:21 PM EDT -----  Contact: [facility] department   See ortho   ----- Message -----  From: Berkley Olsen  Sent: 9/26/2018   1:03 PM  To: Desiree Billy MD    Patient was seen in ED last night for leg swelling. She had a venous doppler done today that was negative for DVT in the right leg, but did show a baker's cyst behind the right knee. Please advise.

## 2018-11-15 RX ORDER — LOSARTAN POTASSIUM 50 MG/1
TABLET ORAL
Qty: 30 TABLET | Refills: 0 | Status: SHIPPED | OUTPATIENT
Start: 2018-11-15 | End: 2018-12-19 | Stop reason: SDUPTHER

## 2018-12-19 RX ORDER — LOSARTAN POTASSIUM 50 MG/1
TABLET ORAL
Qty: 30 TABLET | Refills: 0 | Status: SHIPPED | OUTPATIENT
Start: 2018-12-19 | End: 2019-01-21

## 2018-12-19 RX ORDER — LOSARTAN POTASSIUM 50 MG/1
TABLET ORAL
Qty: 23 TABLET | Refills: 0 | Status: SHIPPED | OUTPATIENT
Start: 2018-12-19 | End: 2018-12-19 | Stop reason: SDUPTHER

## 2019-01-15 ENCOUNTER — OFFICE VISIT (OUTPATIENT)
Dept: INTERNAL MEDICINE CLINIC | Age: 54
End: 2019-01-15

## 2019-01-15 VITALS
HEIGHT: 69 IN | BODY MASS INDEX: 36.73 KG/M2 | WEIGHT: 248 LBS | DIASTOLIC BLOOD PRESSURE: 78 MMHG | SYSTOLIC BLOOD PRESSURE: 128 MMHG | HEART RATE: 80 BPM

## 2019-01-15 DIAGNOSIS — I10 ESSENTIAL HYPERTENSION: Primary | ICD-10-CM

## 2019-01-15 DIAGNOSIS — E66.9 NON MORBID OBESITY: ICD-10-CM

## 2019-01-15 PROCEDURE — G8484 FLU IMMUNIZE NO ADMIN: HCPCS | Performed by: INTERNAL MEDICINE

## 2019-01-15 PROCEDURE — 1036F TOBACCO NON-USER: CPT | Performed by: INTERNAL MEDICINE

## 2019-01-15 PROCEDURE — 3017F COLORECTAL CA SCREEN DOC REV: CPT | Performed by: INTERNAL MEDICINE

## 2019-01-15 PROCEDURE — G8427 DOCREV CUR MEDS BY ELIG CLIN: HCPCS | Performed by: INTERNAL MEDICINE

## 2019-01-15 PROCEDURE — 99213 OFFICE O/P EST LOW 20 MIN: CPT | Performed by: INTERNAL MEDICINE

## 2019-01-15 PROCEDURE — G8417 CALC BMI ABV UP PARAM F/U: HCPCS | Performed by: INTERNAL MEDICINE

## 2019-01-15 ASSESSMENT — ENCOUNTER SYMPTOMS
NAUSEA: 0
VOMITING: 0
ABDOMINAL PAIN: 0
SHORTNESS OF BREATH: 0
CHEST TIGHTNESS: 0
COUGH: 0
BLOOD IN STOOL: 0
DIARRHEA: 0
WHEEZING: 0

## 2019-01-15 ASSESSMENT — PATIENT HEALTH QUESTIONNAIRE - PHQ9
SUM OF ALL RESPONSES TO PHQ QUESTIONS 1-9: 0
2. FEELING DOWN, DEPRESSED OR HOPELESS: 0
SUM OF ALL RESPONSES TO PHQ QUESTIONS 1-9: 0
1. LITTLE INTEREST OR PLEASURE IN DOING THINGS: 0
SUM OF ALL RESPONSES TO PHQ9 QUESTIONS 1 & 2: 0

## 2019-01-21 RX ORDER — LOSARTAN POTASSIUM 50 MG/1
TABLET ORAL
Qty: 30 TABLET | Refills: 2 | Status: SHIPPED | OUTPATIENT
Start: 2019-01-21 | End: 2019-03-20

## 2019-02-15 ENCOUNTER — HOSPITAL ENCOUNTER (EMERGENCY)
Age: 54
Discharge: HOME | End: 2019-02-15
Payer: COMMERCIAL

## 2019-02-15 ENCOUNTER — APPOINTMENT (OUTPATIENT)
Dept: GENERAL RADIOLOGY | Age: 54
End: 2019-02-15
Payer: COMMERCIAL

## 2019-02-15 VITALS
SYSTOLIC BLOOD PRESSURE: 120 MMHG | OXYGEN SATURATION: 100 % | TEMPERATURE: 98.4 F | BODY MASS INDEX: 34.26 KG/M2 | DIASTOLIC BLOOD PRESSURE: 79 MMHG | RESPIRATION RATE: 16 BRPM | WEIGHT: 232 LBS | HEART RATE: 68 BPM

## 2019-02-15 DIAGNOSIS — R53.81 MALAISE: ICD-10-CM

## 2019-02-15 DIAGNOSIS — R51.9 NONINTRACTABLE HEADACHE, UNSPECIFIED CHRONICITY PATTERN, UNSPECIFIED HEADACHE TYPE: Primary | ICD-10-CM

## 2019-02-15 LAB
A/G RATIO: 1.3 (ref 1.1–2.2)
ALBUMIN SERPL-MCNC: 4.3 G/DL (ref 3.4–5)
ALP BLD-CCNC: 94 U/L (ref 40–129)
ALT SERPL-CCNC: 22 U/L (ref 10–40)
ANION GAP SERPL CALCULATED.3IONS-SCNC: 12 MMOL/L (ref 3–16)
AST SERPL-CCNC: 20 U/L (ref 15–37)
BASOPHILS ABSOLUTE: 0 K/UL (ref 0–0.2)
BASOPHILS RELATIVE PERCENT: 0.3 %
BILIRUB SERPL-MCNC: 0.5 MG/DL (ref 0–1)
BUN BLDV-MCNC: 11 MG/DL (ref 7–20)
CALCIUM SERPL-MCNC: 9.4 MG/DL (ref 8.3–10.6)
CHLORIDE BLD-SCNC: 100 MMOL/L (ref 99–110)
CO2: 26 MMOL/L (ref 21–32)
CREAT SERPL-MCNC: <0.5 MG/DL (ref 0.6–1.1)
EOSINOPHILS ABSOLUTE: 0.2 K/UL (ref 0–0.6)
EOSINOPHILS RELATIVE PERCENT: 2.2 %
GFR AFRICAN AMERICAN: >60
GFR NON-AFRICAN AMERICAN: >60
GLOBULIN: 3.2 G/DL
GLUCOSE BLD-MCNC: 113 MG/DL (ref 70–99)
HCT VFR BLD CALC: 35.2 % (ref 36–48)
HEMOGLOBIN: 11.8 G/DL (ref 12–16)
LYMPHOCYTES ABSOLUTE: 1.7 K/UL (ref 1–5.1)
LYMPHOCYTES RELATIVE PERCENT: 18.3 %
MCH RBC QN AUTO: 30.5 PG (ref 26–34)
MCHC RBC AUTO-ENTMCNC: 33.4 G/DL (ref 31–36)
MCV RBC AUTO: 91.3 FL (ref 80–100)
MONOCYTES ABSOLUTE: 0.5 K/UL (ref 0–1.3)
MONOCYTES RELATIVE PERCENT: 5 %
NEUTROPHILS ABSOLUTE: 6.7 K/UL (ref 1.7–7.7)
NEUTROPHILS RELATIVE PERCENT: 74.2 %
PDW BLD-RTO: 13.4 % (ref 12.4–15.4)
PLATELET # BLD: 298 K/UL (ref 135–450)
PMV BLD AUTO: 8.6 FL (ref 5–10.5)
POTASSIUM SERPL-SCNC: 3.8 MMOL/L (ref 3.5–5.1)
RAPID INFLUENZA  B AGN: NEGATIVE
RAPID INFLUENZA A AGN: NEGATIVE
RBC # BLD: 3.85 M/UL (ref 4–5.2)
SODIUM BLD-SCNC: 138 MMOL/L (ref 136–145)
TOTAL PROTEIN: 7.5 G/DL (ref 6.4–8.2)
TROPONIN: <0.01 NG/ML
WBC # BLD: 9 K/UL (ref 4–11)

## 2019-02-15 PROCEDURE — 85025 COMPLETE CBC W/AUTO DIFF WBC: CPT

## 2019-02-15 PROCEDURE — 87804 INFLUENZA ASSAY W/OPTIC: CPT

## 2019-02-15 PROCEDURE — 99283 EMERGENCY DEPT VISIT LOW MDM: CPT

## 2019-02-15 PROCEDURE — 84484 ASSAY OF TROPONIN QUANT: CPT

## 2019-02-15 PROCEDURE — 71046 X-RAY EXAM CHEST 2 VIEWS: CPT

## 2019-02-15 PROCEDURE — 80053 COMPREHEN METABOLIC PANEL: CPT

## 2019-02-15 ASSESSMENT — PAIN SCALES - GENERAL: PAINLEVEL_OUTOF10: 8

## 2019-02-15 ASSESSMENT — PAIN DESCRIPTION - PAIN TYPE: TYPE: ACUTE PAIN

## 2019-02-15 ASSESSMENT — PAIN DESCRIPTION - LOCATION: LOCATION: HEAD

## 2019-02-20 ENCOUNTER — OFFICE VISIT (OUTPATIENT)
Dept: INTERNAL MEDICINE CLINIC | Age: 54
End: 2019-02-20

## 2019-02-20 VITALS
SYSTOLIC BLOOD PRESSURE: 112 MMHG | HEART RATE: 76 BPM | BODY MASS INDEX: 34.51 KG/M2 | WEIGHT: 233 LBS | DIASTOLIC BLOOD PRESSURE: 72 MMHG | HEIGHT: 69 IN | TEMPERATURE: 98.1 F

## 2019-02-20 DIAGNOSIS — I10 ESSENTIAL HYPERTENSION: Primary | ICD-10-CM

## 2019-02-20 DIAGNOSIS — J06.9 UPPER RESPIRATORY TRACT INFECTION, UNSPECIFIED TYPE: ICD-10-CM

## 2019-02-20 PROCEDURE — G8427 DOCREV CUR MEDS BY ELIG CLIN: HCPCS | Performed by: INTERNAL MEDICINE

## 2019-02-20 PROCEDURE — G8417 CALC BMI ABV UP PARAM F/U: HCPCS | Performed by: INTERNAL MEDICINE

## 2019-02-20 PROCEDURE — G8484 FLU IMMUNIZE NO ADMIN: HCPCS | Performed by: INTERNAL MEDICINE

## 2019-02-20 PROCEDURE — 99213 OFFICE O/P EST LOW 20 MIN: CPT | Performed by: INTERNAL MEDICINE

## 2019-02-20 PROCEDURE — 3017F COLORECTAL CA SCREEN DOC REV: CPT | Performed by: INTERNAL MEDICINE

## 2019-02-20 PROCEDURE — 1036F TOBACCO NON-USER: CPT | Performed by: INTERNAL MEDICINE

## 2019-02-20 ASSESSMENT — ENCOUNTER SYMPTOMS
NAUSEA: 0
DIARRHEA: 0
VOMITING: 0
COUGH: 0
ABDOMINAL PAIN: 0
BLOOD IN STOOL: 0
SHORTNESS OF BREATH: 0
CHEST TIGHTNESS: 0
WHEEZING: 0

## 2019-03-20 ENCOUNTER — APPOINTMENT (OUTPATIENT)
Dept: GENERAL RADIOLOGY | Age: 54
End: 2019-03-20
Payer: COMMERCIAL

## 2019-03-20 ENCOUNTER — HOSPITAL ENCOUNTER (EMERGENCY)
Age: 54
Discharge: HOME | End: 2019-03-20
Attending: EMERGENCY MEDICINE
Payer: COMMERCIAL

## 2019-03-20 VITALS
OXYGEN SATURATION: 99 % | TEMPERATURE: 97.7 F | DIASTOLIC BLOOD PRESSURE: 84 MMHG | RESPIRATION RATE: 18 BRPM | WEIGHT: 222 LBS | BODY MASS INDEX: 32.78 KG/M2 | HEART RATE: 72 BPM | SYSTOLIC BLOOD PRESSURE: 149 MMHG

## 2019-03-20 DIAGNOSIS — H81.10 BENIGN PAROXYSMAL POSITIONAL VERTIGO, UNSPECIFIED LATERALITY: Primary | ICD-10-CM

## 2019-03-20 DIAGNOSIS — R11.2 NON-INTRACTABLE VOMITING WITH NAUSEA, UNSPECIFIED VOMITING TYPE: ICD-10-CM

## 2019-03-20 LAB
A/G RATIO: 1.1 (ref 1.1–2.2)
ALBUMIN SERPL-MCNC: 4 G/DL (ref 3.4–5)
ALP BLD-CCNC: 101 U/L (ref 40–129)
ALT SERPL-CCNC: 23 U/L (ref 10–40)
ANION GAP SERPL CALCULATED.3IONS-SCNC: 11 MMOL/L (ref 3–16)
AST SERPL-CCNC: 30 U/L (ref 15–37)
BACTERIA: ABNORMAL /HPF
BASOPHILS ABSOLUTE: 0.1 K/UL (ref 0–0.2)
BASOPHILS RELATIVE PERCENT: 0.8 %
BILIRUB SERPL-MCNC: 0.5 MG/DL (ref 0–1)
BILIRUBIN URINE: NEGATIVE
BLOOD, URINE: NEGATIVE
BUN BLDV-MCNC: 11 MG/DL (ref 7–20)
CALCIUM SERPL-MCNC: 9.4 MG/DL (ref 8.3–10.6)
CHLORIDE BLD-SCNC: 100 MMOL/L (ref 99–110)
CLARITY: CLEAR
CO2: 25 MMOL/L (ref 21–32)
COLOR: YELLOW
CREAT SERPL-MCNC: <0.5 MG/DL (ref 0.6–1.1)
EKG ATRIAL RATE: 66 BPM
EKG DIAGNOSIS: NORMAL
EKG P AXIS: 64 DEGREES
EKG P-R INTERVAL: 158 MS
EKG Q-T INTERVAL: 416 MS
EKG QRS DURATION: 98 MS
EKG QTC CALCULATION (BAZETT): 436 MS
EKG R AXIS: 43 DEGREES
EKG T AXIS: 30 DEGREES
EKG VENTRICULAR RATE: 66 BPM
EOSINOPHILS ABSOLUTE: 0.3 K/UL (ref 0–0.6)
EOSINOPHILS RELATIVE PERCENT: 4.6 %
EPITHELIAL CELLS, UA: ABNORMAL /HPF
GFR AFRICAN AMERICAN: >60
GFR NON-AFRICAN AMERICAN: >60
GLOBULIN: 3.7 G/DL
GLUCOSE BLD-MCNC: 114 MG/DL (ref 70–99)
GLUCOSE URINE: NEGATIVE MG/DL
HCT VFR BLD CALC: 36.5 % (ref 36–48)
HEMOGLOBIN: 12.1 G/DL (ref 12–16)
KETONES, URINE: NEGATIVE MG/DL
LEUKOCYTE ESTERASE, URINE: ABNORMAL
LYMPHOCYTES ABSOLUTE: 1.6 K/UL (ref 1–5.1)
LYMPHOCYTES RELATIVE PERCENT: 24.6 %
MCH RBC QN AUTO: 30.3 PG (ref 26–34)
MCHC RBC AUTO-ENTMCNC: 33.2 G/DL (ref 31–36)
MCV RBC AUTO: 91.1 FL (ref 80–100)
MICROSCOPIC EXAMINATION: YES
MONOCYTES ABSOLUTE: 0.3 K/UL (ref 0–1.3)
MONOCYTES RELATIVE PERCENT: 5 %
MUCUS: ABNORMAL /LPF
NEUTROPHILS ABSOLUTE: 4.3 K/UL (ref 1.7–7.7)
NEUTROPHILS RELATIVE PERCENT: 65 %
NITRITE, URINE: NEGATIVE
PDW BLD-RTO: 13.4 % (ref 12.4–15.4)
PH UA: 6.5 (ref 5–8)
PLATELET # BLD: 277 K/UL (ref 135–450)
PMV BLD AUTO: 9.2 FL (ref 5–10.5)
POTASSIUM SERPL-SCNC: 4.8 MMOL/L (ref 3.5–5.1)
PROTEIN UA: NEGATIVE MG/DL
RBC # BLD: 4.01 M/UL (ref 4–5.2)
RBC UA: ABNORMAL /HPF (ref 0–2)
SODIUM BLD-SCNC: 136 MMOL/L (ref 136–145)
SPECIFIC GRAVITY UA: 1.01 (ref 1–1.03)
TOTAL PROTEIN: 7.7 G/DL (ref 6.4–8.2)
TROPONIN: <0.01 NG/ML
URINE REFLEX TO CULTURE: YES
URINE TYPE: ABNORMAL
UROBILINOGEN, URINE: 0.2 E.U./DL
WBC # BLD: 6.7 K/UL (ref 4–11)
WBC UA: ABNORMAL /HPF (ref 0–5)

## 2019-03-20 PROCEDURE — 6360000002 HC RX W HCPCS: Performed by: NURSE PRACTITIONER

## 2019-03-20 PROCEDURE — 80053 COMPREHEN METABOLIC PANEL: CPT

## 2019-03-20 PROCEDURE — 85025 COMPLETE CBC W/AUTO DIFF WBC: CPT

## 2019-03-20 PROCEDURE — 6370000000 HC RX 637 (ALT 250 FOR IP): Performed by: NURSE PRACTITIONER

## 2019-03-20 PROCEDURE — 96361 HYDRATE IV INFUSION ADD-ON: CPT

## 2019-03-20 PROCEDURE — 81001 URINALYSIS AUTO W/SCOPE: CPT

## 2019-03-20 PROCEDURE — 96374 THER/PROPH/DIAG INJ IV PUSH: CPT

## 2019-03-20 PROCEDURE — 93010 ELECTROCARDIOGRAM REPORT: CPT | Performed by: INTERNAL MEDICINE

## 2019-03-20 PROCEDURE — 84484 ASSAY OF TROPONIN QUANT: CPT

## 2019-03-20 PROCEDURE — 99284 EMERGENCY DEPT VISIT MOD MDM: CPT

## 2019-03-20 PROCEDURE — 93005 ELECTROCARDIOGRAM TRACING: CPT | Performed by: EMERGENCY MEDICINE

## 2019-03-20 PROCEDURE — 87086 URINE CULTURE/COLONY COUNT: CPT

## 2019-03-20 PROCEDURE — 71046 X-RAY EXAM CHEST 2 VIEWS: CPT

## 2019-03-20 PROCEDURE — 2580000003 HC RX 258: Performed by: NURSE PRACTITIONER

## 2019-03-20 RX ORDER — 0.9 % SODIUM CHLORIDE 0.9 %
1000 INTRAVENOUS SOLUTION INTRAVENOUS ONCE
Status: COMPLETED | OUTPATIENT
Start: 2019-03-20 | End: 2019-03-20

## 2019-03-20 RX ORDER — ONDANSETRON 4 MG/1
4 TABLET, ORALLY DISINTEGRATING ORAL EVERY 8 HOURS PRN
Qty: 20 TABLET | Refills: 0 | Status: SHIPPED | OUTPATIENT
Start: 2019-03-20 | End: 2019-09-17

## 2019-03-20 RX ORDER — MECLIZINE HCL 12.5 MG/1
25 TABLET ORAL ONCE
Status: COMPLETED | OUTPATIENT
Start: 2019-03-20 | End: 2019-03-20

## 2019-03-20 RX ORDER — MECLIZINE HYDROCHLORIDE 25 MG/1
25 TABLET ORAL 3 TIMES DAILY PRN
Qty: 30 TABLET | Refills: 0 | Status: SHIPPED | OUTPATIENT
Start: 2019-03-20 | End: 2019-03-30

## 2019-03-20 RX ORDER — ONDANSETRON 2 MG/ML
4 INJECTION INTRAMUSCULAR; INTRAVENOUS ONCE
Status: COMPLETED | OUTPATIENT
Start: 2019-03-20 | End: 2019-03-20

## 2019-03-20 RX ORDER — LOSARTAN POTASSIUM 25 MG/1
25 TABLET ORAL DAILY
COMMUNITY
End: 2019-09-17

## 2019-03-20 RX ADMIN — ONDANSETRON 4 MG: 2 INJECTION INTRAMUSCULAR; INTRAVENOUS at 09:48

## 2019-03-20 RX ADMIN — SODIUM CHLORIDE 1000 ML: 9 INJECTION, SOLUTION INTRAVENOUS at 09:49

## 2019-03-20 RX ADMIN — MECLIZINE 25 MG: 12.5 TABLET ORAL at 09:48

## 2019-03-20 ASSESSMENT — ENCOUNTER SYMPTOMS
COUGH: 0
WHEEZING: 0
ABDOMINAL DISTENTION: 0
VOMITING: 1
DIARRHEA: 0
SHORTNESS OF BREATH: 0
NAUSEA: 1
CONSTIPATION: 0
ALLERGIC/IMMUNOLOGIC NEGATIVE: 1
BACK PAIN: 0

## 2019-03-20 ASSESSMENT — PAIN SCALES - GENERAL: PAINLEVEL_OUTOF10: 7

## 2019-03-21 LAB — URINE CULTURE, ROUTINE: NORMAL

## 2019-09-17 ENCOUNTER — HOSPITAL ENCOUNTER (OUTPATIENT)
Dept: GENERAL RADIOLOGY | Age: 54
Discharge: HOME | End: 2019-09-17
Payer: COMMERCIAL

## 2019-09-17 ENCOUNTER — TELEPHONE (OUTPATIENT)
Dept: FAMILY MEDICINE CLINIC | Age: 54
End: 2019-09-17

## 2019-09-17 ENCOUNTER — OFFICE VISIT (OUTPATIENT)
Dept: FAMILY MEDICINE CLINIC | Age: 54
End: 2019-09-17
Payer: COMMERCIAL

## 2019-09-17 VITALS
HEART RATE: 70 BPM | BODY MASS INDEX: 33.24 KG/M2 | WEIGHT: 224.4 LBS | OXYGEN SATURATION: 99 % | HEIGHT: 69 IN | TEMPERATURE: 98.2 F | DIASTOLIC BLOOD PRESSURE: 74 MMHG | SYSTOLIC BLOOD PRESSURE: 138 MMHG

## 2019-09-17 DIAGNOSIS — R06.02 SOB (SHORTNESS OF BREATH): Primary | ICD-10-CM

## 2019-09-17 DIAGNOSIS — I10 ESSENTIAL HYPERTENSION: ICD-10-CM

## 2019-09-17 DIAGNOSIS — R05.9 COUGH: ICD-10-CM

## 2019-09-17 DIAGNOSIS — R06.02 SOB (SHORTNESS OF BREATH): ICD-10-CM

## 2019-09-17 PROCEDURE — 3017F COLORECTAL CA SCREEN DOC REV: CPT | Performed by: FAMILY MEDICINE

## 2019-09-17 PROCEDURE — G8417 CALC BMI ABV UP PARAM F/U: HCPCS | Performed by: FAMILY MEDICINE

## 2019-09-17 PROCEDURE — 3014F SCREEN MAMMO DOC REV: CPT | Performed by: FAMILY MEDICINE

## 2019-09-17 PROCEDURE — 1036F TOBACCO NON-USER: CPT | Performed by: FAMILY MEDICINE

## 2019-09-17 PROCEDURE — 71046 X-RAY EXAM CHEST 2 VIEWS: CPT

## 2019-09-17 PROCEDURE — G8427 DOCREV CUR MEDS BY ELIG CLIN: HCPCS | Performed by: FAMILY MEDICINE

## 2019-09-17 PROCEDURE — 99203 OFFICE O/P NEW LOW 30 MIN: CPT | Performed by: FAMILY MEDICINE

## 2019-09-17 ASSESSMENT — ENCOUNTER SYMPTOMS
EYE PAIN: 0
CONSTIPATION: 0
DIARRHEA: 0
VOMITING: 0
WHEEZING: 0
EYE DISCHARGE: 0
SINUS PRESSURE: 0
CHEST TIGHTNESS: 0
EYE REDNESS: 0
NAUSEA: 0
RHINORRHEA: 0
ABDOMINAL PAIN: 0
BLOOD IN STOOL: 0

## 2019-09-17 NOTE — PATIENT INSTRUCTIONS

## 2019-09-17 NOTE — TELEPHONE ENCOUNTER
The mammogram report they faxed us from may shows a normal mammogram. She mentioned she had a lump in that breast that she had to have further imaging for- when was that? Does she feel a lump under the area of rash? I am not sure what the rash is as it is so localized to that one area- and almost looks like broken capillary blood vessels making that pattern, does not look like eczema  Did she put any cortisone cream on area?

## 2019-09-17 NOTE — TELEPHONE ENCOUNTER
Patient was not sure about a lump, she did say there was a lump seen in her breast on last mammogram but the report we received did not mention anything and was normal.  She was not sure about any of this now and does not know if she can feel a lump in area of rash or not, she is currently at store and it is very busy so will have to follow up once she gets home. She has not tried any cortisone or anything on the area with the rash but will try that and see what happens. She will follow up with us.

## 2019-09-17 NOTE — TELEPHONE ENCOUNTER
Patient wanted to find out if physician had any thoughts regarding the rash on her breast and if she had time to take a look at her mammogram results from May.   She would like to know if she should see a dermatologist.

## 2019-09-20 ASSESSMENT — ENCOUNTER SYMPTOMS
COUGH: 1
SHORTNESS OF BREATH: 1

## 2019-10-08 ENCOUNTER — OFFICE VISIT (OUTPATIENT)
Dept: FAMILY MEDICINE CLINIC | Age: 54
End: 2019-10-08
Payer: COMMERCIAL

## 2019-10-08 VITALS
WEIGHT: 227.4 LBS | TEMPERATURE: 98.3 F | HEIGHT: 69 IN | HEART RATE: 64 BPM | SYSTOLIC BLOOD PRESSURE: 136 MMHG | BODY MASS INDEX: 33.68 KG/M2 | OXYGEN SATURATION: 98 % | DIASTOLIC BLOOD PRESSURE: 70 MMHG

## 2019-10-08 DIAGNOSIS — R73.9 HYPERGLYCEMIA: ICD-10-CM

## 2019-10-08 DIAGNOSIS — Z00.00 ANNUAL PHYSICAL EXAM: Primary | ICD-10-CM

## 2019-10-08 DIAGNOSIS — Z13.220 SCREENING, LIPID: ICD-10-CM

## 2019-10-08 LAB
CHOLESTEROL, TOTAL: 190 MG/DL (ref 0–199)
HDLC SERPL-MCNC: 62 MG/DL (ref 40–60)
LDL CHOLESTEROL CALCULATED: 105 MG/DL
TRIGL SERPL-MCNC: 116 MG/DL (ref 0–150)
VLDLC SERPL CALC-MCNC: 23 MG/DL

## 2019-10-08 PROCEDURE — 90471 IMMUNIZATION ADMIN: CPT | Performed by: FAMILY MEDICINE

## 2019-10-08 PROCEDURE — 99396 PREV VISIT EST AGE 40-64: CPT | Performed by: FAMILY MEDICINE

## 2019-10-08 PROCEDURE — G8482 FLU IMMUNIZE ORDER/ADMIN: HCPCS | Performed by: FAMILY MEDICINE

## 2019-10-08 PROCEDURE — 36415 COLL VENOUS BLD VENIPUNCTURE: CPT | Performed by: FAMILY MEDICINE

## 2019-10-08 PROCEDURE — 90686 IIV4 VACC NO PRSV 0.5 ML IM: CPT | Performed by: FAMILY MEDICINE

## 2019-10-08 ASSESSMENT — ENCOUNTER SYMPTOMS
EYE PAIN: 0
ABDOMINAL PAIN: 0
CONSTIPATION: 0
DIARRHEA: 0
VOMITING: 0
CHEST TIGHTNESS: 0
COLOR CHANGE: 0
EYE REDNESS: 0
RHINORRHEA: 0
EYE ITCHING: 0
CHOKING: 0
SHORTNESS OF BREATH: 0
EYE DISCHARGE: 0

## 2019-10-09 LAB
ESTIMATED AVERAGE GLUCOSE: 114 MG/DL
HBA1C MFR BLD: 5.6 %

## 2020-03-23 ENCOUNTER — TELEPHONE (OUTPATIENT)
Dept: FAMILY MEDICINE CLINIC | Age: 55
End: 2020-03-23

## 2020-03-23 NOTE — PATIENT INSTRUCTIONS
Preventing the Spread of Coronavirus Disease 2019 in Homes and Residential Communities   For the most recent information go to [email or web address]    Prevention steps for People with confirmed or suspected COVID-19 (including persons under investigation) who do not need to be hospitalized  and   People with confirmed COVID-19 who were hospitalized and determined to be medically stable to go home    Your healthcare provider and public health staff will evaluate whether you can be cared for at home. If it is determined that you do not need to be hospitalized and can be isolated at home, you will be monitored by staff from your local or state health department. You should follow the prevention steps below until a healthcare provider or local or state health department says you can return to your normal activities. Stay home except to get medical care  People who are mildly ill with COVID-19 are able to isolate at home during their illness. You should restrict activities outside your home, except for getting medical care. Do not go to work, school, or public areas. Avoid using public transportation, ride-sharing, or taxis. Separate yourself from other people and animals in your home  People: As much as possible, you should stay in a specific room and away from other people in your home. Also, you should use a separate bathroom, if available. Animals: You should restrict contact with pets and other animals while you are sick with COVID-19, just like you would around other people. Although there have not been reports of pets or other animals becoming sick with COVID-19, it is still recommended that people sick with COVID-19 limit contact with animals until more information is known about the virus. When possible, have another member of your household care for your animals while you are sick.  If you are sick with COVID-19, avoid contact with your pet, including be washed thoroughly with soap and water. Clean all high-touch surfaces everyday  High touch surfaces include counters, tabletops, doorknobs, bathroom fixtures, toilets, phones, keyboards, tablets, and bedside tables. Also, clean any surfaces that may have blood, stool, or body fluids on them. Use a household cleaning spray or wipe, according to the label instructions. Labels contain instructions for safe and effective use of the cleaning product including precautions you should take when applying the product, such as wearing gloves and making sure you have good ventilation during use of the product. Monitor your symptoms  Seek prompt medical attention if your illness is worsening (e.g., difficulty breathing). Before seeking care, call your healthcare provider and tell them that you have, or are being evaluated for, COVID-19. Put on a facemask before you enter the facility. These steps will help the healthcare providers office to keep other people in the office or waiting room from getting infected or exposed. Ask your healthcare provider to call the local or [facility] health department. Persons who are placed under active monitoring or facilitated self-monitoring should follow instructions provided by their local health department or occupational health professionals, as appropriate. When working with your local health department check their available hours. If you have a medical emergency and need to call 911, notify the dispatch personnel that you have, or are being evaluated for COVID-19. If possible, put on a facemask before emergency medical services arrive. Discontinuing home isolation  Patients with confirmed COVID-19 should remain under home isolation precautions until the risk of secondary transmission to others is thought to be low.  The decision to discontinue home isolation precautions should be made on a case-by-case basis, in consultation with healthcare providers and state and [facility] health departments. Thank you for enrolling in [address] Please follow the instructions below to securely access your online medical record. [employer] allows you to send messages to your doctor, view your test results, renew your prescriptions, schedule appointments, and more. How Do I Sign Up? 1. In your Internet browser, go to [email or web address]  2. Click on the Sign Up Now link in the Sign In box. You will see the New Member Sign Up page. 3. Enter your [employer] Access Code exactly as it appears below. You will not need to use this code after youve completed the sign-up process. If you do not sign up before the expiration date, you must request a new code. [employer] Access Code: [email or web address]  Expires: 5/7/2020 11:44 AM    4. Enter your Social Security Number ([id number]) and Date of Birth (mm/dd/yyyy) as indicated and click Submit. You will be taken to the next sign-up page. 5. Create a [employer] ID. This will be your [employer] login ID and cannot be changed, so think of one that is secure and easy to remember. 6. Create a [employer] password. You can change your password at any time. 7. Enter your Password Reset Question and Answer. This can be used at a later time if you forget your password. 8. Enter your e-mail address. You will receive e-mail notification when new information is available in [address] 9. Click Sign Up. You can now view your medical record. Additional Information  If you have questions, please contact your physician practice where you receive care. Remember, [employer] is NOT to be used for urgent needs. For medical emergencies, dial 911.

## 2020-03-24 ENCOUNTER — OFFICE VISIT (OUTPATIENT)
Dept: PRIMARY CARE CLINIC | Age: 55
End: 2020-03-24
Payer: COMMERCIAL

## 2020-03-24 VITALS — HEART RATE: 77 BPM | OXYGEN SATURATION: 98 % | TEMPERATURE: 98.3 F

## 2020-03-24 PROCEDURE — 1036F TOBACCO NON-USER: CPT | Performed by: NURSE PRACTITIONER

## 2020-03-24 PROCEDURE — 99212 OFFICE O/P EST SF 10 MIN: CPT | Performed by: NURSE PRACTITIONER

## 2020-03-24 PROCEDURE — G9899 SCRN MAM PERF RSLTS DOC: HCPCS | Performed by: NURSE PRACTITIONER

## 2020-03-24 PROCEDURE — G8482 FLU IMMUNIZE ORDER/ADMIN: HCPCS | Performed by: NURSE PRACTITIONER

## 2020-03-24 PROCEDURE — G8417 CALC BMI ABV UP PARAM F/U: HCPCS | Performed by: NURSE PRACTITIONER

## 2020-03-24 PROCEDURE — G8428 CUR MEDS NOT DOCUMENT: HCPCS | Performed by: NURSE PRACTITIONER

## 2020-03-24 PROCEDURE — 3017F COLORECTAL CA SCREEN DOC REV: CPT | Performed by: NURSE PRACTITIONER

## 2020-03-31 ENCOUNTER — TELEPHONE (OUTPATIENT)
Dept: PRIMARY CARE CLINIC | Age: 55
End: 2020-03-31

## 2020-03-31 NOTE — TELEPHONE ENCOUNTER
Reached out to patient for follow up from visit to flu clinic on 3/24/20. Feeling better but still having some SOB but infrequent. Feeling better overall.

## 2020-10-23 ENCOUNTER — OFFICE VISIT (OUTPATIENT)
Dept: FAMILY MEDICINE CLINIC | Age: 55
End: 2020-10-23
Payer: COMMERCIAL

## 2020-10-23 VITALS
DIASTOLIC BLOOD PRESSURE: 80 MMHG | HEIGHT: 67 IN | BODY MASS INDEX: 39.49 KG/M2 | TEMPERATURE: 97.8 F | OXYGEN SATURATION: 98 % | WEIGHT: 251.6 LBS | HEART RATE: 82 BPM | SYSTOLIC BLOOD PRESSURE: 138 MMHG

## 2020-10-23 PROBLEM — Z23 NEED FOR DIPHTHERIA-TETANUS-PERTUSSIS (TDAP) VACCINE: Status: ACTIVE | Noted: 2020-10-23

## 2020-10-23 PROBLEM — Z00.00 ANNUAL PHYSICAL EXAM: Status: ACTIVE | Noted: 2020-10-23

## 2020-10-23 LAB
CHOLESTEROL, TOTAL: 209 MG/DL (ref 0–199)
GLUCOSE BLD-MCNC: 106 MG/DL (ref 70–99)
HDLC SERPL-MCNC: 55 MG/DL (ref 40–60)
LDL CHOLESTEROL CALCULATED: 131 MG/DL
TRIGL SERPL-MCNC: 117 MG/DL (ref 0–150)
VLDLC SERPL CALC-MCNC: 23 MG/DL

## 2020-10-23 PROCEDURE — 36415 COLL VENOUS BLD VENIPUNCTURE: CPT | Performed by: FAMILY MEDICINE

## 2020-10-23 PROCEDURE — 90472 IMMUNIZATION ADMIN EACH ADD: CPT | Performed by: FAMILY MEDICINE

## 2020-10-23 PROCEDURE — G8482 FLU IMMUNIZE ORDER/ADMIN: HCPCS | Performed by: FAMILY MEDICINE

## 2020-10-23 PROCEDURE — 99396 PREV VISIT EST AGE 40-64: CPT | Performed by: FAMILY MEDICINE

## 2020-10-23 PROCEDURE — 90715 TDAP VACCINE 7 YRS/> IM: CPT | Performed by: FAMILY MEDICINE

## 2020-10-23 PROCEDURE — 90686 IIV4 VACC NO PRSV 0.5 ML IM: CPT | Performed by: FAMILY MEDICINE

## 2020-10-23 PROCEDURE — 90471 IMMUNIZATION ADMIN: CPT | Performed by: FAMILY MEDICINE

## 2020-10-23 ASSESSMENT — ENCOUNTER SYMPTOMS
NAUSEA: 0
CHEST TIGHTNESS: 0
EYE PAIN: 0
COUGH: 0
DIARRHEA: 0
RHINORRHEA: 0
WHEEZING: 0
VOMITING: 0
EYE REDNESS: 0
EYE DISCHARGE: 0
SHORTNESS OF BREATH: 0
ABDOMINAL PAIN: 0
SINUS PRESSURE: 0
CONSTIPATION: 0
BLOOD IN STOOL: 0

## 2020-10-23 ASSESSMENT — PATIENT HEALTH QUESTIONNAIRE - PHQ9
SUM OF ALL RESPONSES TO PHQ QUESTIONS 1-9: 0
2. FEELING DOWN, DEPRESSED OR HOPELESS: 0
SUM OF ALL RESPONSES TO PHQ9 QUESTIONS 1 & 2: 0
SUM OF ALL RESPONSES TO PHQ QUESTIONS 1-9: 0
1. LITTLE INTEREST OR PLEASURE IN DOING THINGS: 0
SUM OF ALL RESPONSES TO PHQ QUESTIONS 1-9: 0

## 2020-10-23 NOTE — PROGRESS NOTES
Subjective:      Patient ID: Monae Panchal is a 48 y.o. female. HPI  Chief Complaint   Patient presents with    Annual Exam     Patient is here for a physical, she is fasting for blood work. She would like a flu shot. Here for CPE. Nonsmoker. Needs eye exam  Sees [facility]. [address] for gyn exam  Not on optiva plan anymore. Is not exercising  States she gained back weight  States having some chest pressure at times. Has had echo in 2015- wnl. Also had workup in ED 2019. EKG NSR  Does not endorse chest pain climbing stairs  Does know she is out of shape also has anxiety at times  Sees ortho for knee pain- gets injections  Monae Panchal is a 48 y.o. female with the following history as recorded in [employer]:  Patient Active Problem List    Diagnosis Date Noted    Non morbid obesity 01/11/2016    Essential hypertension 09/09/2015     No current outpatient medications on file. No current facility-administered medications for this visit. Allergies: Codeine  Past Medical History:   Diagnosis Date    Bursitis     colonoscopy 03/06/2018    Dyspnea     GERD (gastroesophageal reflux disease)     Hypertension      Past Surgical History:   Procedure Laterality Date    CHOLECYSTECTOMY      COLONOSCOPY  03/06/2018     Family History   Problem Relation Age of Onset    Diabetes Father     Cancer Father     Colon Cancer Father     Mental Retardation Sister     Diabetes Sister     High Blood Pressure Brother     Diabetes Sister      Social History     Tobacco Use    Smoking status: Never Smoker    Smokeless tobacco: Never Used   Substance Use Topics    Alcohol use: No     Vitals:    10/23/20 0817   BP: 138/80   Pulse: 82   Temp: 97.8 °F (36.6 °C)   TempSrc: Temporal   SpO2: 98%   Weight: 251 lb 9.6 oz (114.1 kg)   Height: 5' 7\" (1.702 m)     Body mass index is 39.41 kg/m².      Wt Readings from Last 3 Encounters:   10/23/20 251 lb 9.6 oz (114.1 kg)   10/08/19 227 lb 6.4 oz (103.1 kg)   09/17/19 224 lb 6.4 oz (101.8 kg)     BP Readings from Last 3 Encounters:   10/23/20 138/80   10/08/19 136/70   09/17/19 138/74        Review of Systems   Constitutional: Negative for chills, fatigue, fever and unexpected weight change. HENT: Negative for ear discharge, ear pain, hearing loss, rhinorrhea, sinus pressure and tinnitus. Eyes: Negative for pain, discharge, redness and visual disturbance. Respiratory: Negative for cough, chest tightness, shortness of breath and wheezing. Cardiovascular: Negative for chest pain and palpitations. Gastrointestinal: Negative for abdominal pain, blood in stool, constipation, diarrhea, nausea and vomiting. Genitourinary: Negative for difficulty urinating, dysuria and hematuria. Musculoskeletal: Positive for arthralgias (knee pain- left). Neurological: Negative for dizziness, seizures, syncope and headaches. Hematological: Negative for adenopathy. Does not bruise/bleed easily. Psychiatric/Behavioral: Negative for dysphoric mood and sleep disturbance. The patient is not nervous/anxious. Objective:   Physical Exam  Constitutional:       General: She is not in acute distress. Appearance: Normal appearance. She is well-developed. She is obese. HENT:      Head: Normocephalic. Right Ear: Tympanic membrane, ear canal and external ear normal.      Left Ear: Tympanic membrane, ear canal and external ear normal.      Nose: Nose normal. No rhinorrhea. Mouth/Throat:      Mouth: Mucous membranes are moist.      Pharynx: Oropharynx is clear. No oropharyngeal exudate. Eyes:      General: No scleral icterus. Right eye: No discharge. Left eye: No discharge. Extraocular Movements: Extraocular movements intact. Conjunctiva/sclera: Conjunctivae normal.      Pupils: Pupils are equal, round, and reactive to light. Neck:      Musculoskeletal: Normal range of motion and neck supple. No neck rigidity.    Cardiovascular:      Rate and Rhythm: Order Specific Question:   Is Patient Fasting?/# of Hours     Answer:   12    Glucose          AMIRAH Colon [address] [facility], DO

## 2020-10-23 NOTE — PATIENT INSTRUCTIONS

## 2020-10-23 NOTE — PROGRESS NOTES
Vaccine Information Sheet, \"Influenza - Inactivated\"  given to [facility], or parent/legal guardian of  [facility] and verbalized understanding. Patient responses:    Have you ever had a reaction to a flu vaccine? No  Do you have any current illness? No  Have you ever had Guillian [place] Syndrome? No  Do you have a serious allergy to any of the follow: Neomycin, Polymyxin, Thimerosal, eggs or egg products? No    Flu vaccine given per order. Please see immunization tab. Risks and benefits explained. Current VIS given.

## 2020-11-22 PROBLEM — Z00.00 ANNUAL PHYSICAL EXAM: Status: RESOLVED | Noted: 2020-10-23 | Resolved: 2020-11-22

## 2020-12-21 ENCOUNTER — OFFICE VISIT (OUTPATIENT)
Dept: FAMILY MEDICINE CLINIC | Age: 55
End: 2020-12-21
Payer: COMMERCIAL

## 2020-12-21 VITALS
SYSTOLIC BLOOD PRESSURE: 138 MMHG | TEMPERATURE: 97.2 F | DIASTOLIC BLOOD PRESSURE: 82 MMHG | BODY MASS INDEX: 39.62 KG/M2 | HEIGHT: 67 IN | WEIGHT: 252.4 LBS | HEART RATE: 92 BPM | OXYGEN SATURATION: 98 %

## 2020-12-21 PROCEDURE — 99213 OFFICE O/P EST LOW 20 MIN: CPT | Performed by: FAMILY MEDICINE

## 2020-12-21 PROCEDURE — 3017F COLORECTAL CA SCREEN DOC REV: CPT | Performed by: FAMILY MEDICINE

## 2020-12-21 PROCEDURE — G8417 CALC BMI ABV UP PARAM F/U: HCPCS | Performed by: FAMILY MEDICINE

## 2020-12-21 PROCEDURE — 1036F TOBACCO NON-USER: CPT | Performed by: FAMILY MEDICINE

## 2020-12-21 PROCEDURE — G8427 DOCREV CUR MEDS BY ELIG CLIN: HCPCS | Performed by: FAMILY MEDICINE

## 2020-12-21 PROCEDURE — G9899 SCRN MAM PERF RSLTS DOC: HCPCS | Performed by: FAMILY MEDICINE

## 2020-12-21 PROCEDURE — G8482 FLU IMMUNIZE ORDER/ADMIN: HCPCS | Performed by: FAMILY MEDICINE

## 2020-12-21 SDOH — ECONOMIC STABILITY: FOOD INSECURITY: WITHIN THE PAST 12 MONTHS, THE FOOD YOU BOUGHT JUST DIDN'T LAST AND YOU DIDN'T HAVE MONEY TO GET MORE.: NEVER TRUE

## 2020-12-21 SDOH — ECONOMIC STABILITY: TRANSPORTATION INSECURITY
IN THE PAST 12 MONTHS, HAS LACK OF TRANSPORTATION KEPT YOU FROM MEETINGS, WORK, OR FROM GETTING THINGS NEEDED FOR DAILY LIVING?: NOT ASKED

## 2020-12-21 SDOH — ECONOMIC STABILITY: TRANSPORTATION INSECURITY
IN THE PAST 12 MONTHS, HAS THE LACK OF TRANSPORTATION KEPT YOU FROM MEDICAL APPOINTMENTS OR FROM GETTING MEDICATIONS?: NOT ASKED

## 2020-12-21 SDOH — ECONOMIC STABILITY: FOOD INSECURITY: WITHIN THE PAST 12 MONTHS, YOU WORRIED THAT YOUR FOOD WOULD RUN OUT BEFORE YOU GOT MONEY TO BUY MORE.: NEVER TRUE

## 2020-12-21 SDOH — ECONOMIC STABILITY: INCOME INSECURITY: HOW HARD IS IT FOR YOU TO PAY FOR THE VERY BASICS LIKE FOOD, HOUSING, MEDICAL CARE, AND HEATING?: NOT HARD AT ALL

## 2020-12-21 NOTE — PROGRESS NOTES
Subjective:      Patient ID: Zee Marie is a 48 y.o. female. HPI  Chief Complaint   Patient presents with    Pain     Patient is here with complaints of pain in her upper left thigh, pain is in the back of her left thigh and feels like a charlette horse, she says this began about 1 month ago. Here for c/o pain and lump in back of thigh x 1 month. Does have knee arthritis. Worried about blood clot  No redness, swelling    Vitals:    12/21/20 1428   BP: 138/82   Pulse: 92   Temp: 97.2 °F (36.2 °C)   TempSrc: Temporal   SpO2: 98%   Weight: 252 lb 6.4 oz (114.5 kg)   Height: 5' 7\" (1.702 m)     Body mass index is 39.53 kg/m². Wt Readings from Last 3 Encounters:   12/21/20 252 lb 6.4 oz (114.5 kg)   10/23/20 251 lb 9.6 oz (114.1 kg)   10/08/19 227 lb 6.4 oz (103.1 kg)     BP Readings from Last 3 Encounters:   12/21/20 138/82   10/23/20 138/80   10/08/19 136/70        Review of Systems   Constitutional: Negative for chills and fever. Eyes: Negative for pain and visual disturbance. Respiratory: Negative for shortness of breath. Cardiovascular: Negative for chest pain. Gastrointestinal: Negative for abdominal pain, nausea and vomiting. Musculoskeletal: Positive for arthralgias and myalgias. Neurological: Negative for dizziness, syncope and headaches. Objective:   Physical Exam  Constitutional:       Appearance: Normal appearance. She is obese. She is not ill-appearing. Musculoskeletal: Normal range of motion. General: Tenderness (left posterior thigh- above knee ttp, small bulge felt) present. Right lower leg: No edema. Left lower leg: No edema. Skin:     General: Skin is warm. Findings: No erythema or rash. Neurological:      General: No focal deficit present. Mental Status: She is alert and oriented to person, place, and time. Cranial Nerves: No cranial nerve deficit. Sensory: No sensory deficit. Motor: No weakness.    Psychiatric: Mood and Affect: Mood normal.         Behavior: Behavior normal.         Assessment:      Left thigh pain-?  Muscle vs cyst vs blood clot      Plan:      Orders Placed This Encounter   Procedures    US DUP LOWER EXTREMITY LEFT HENRY     Standing Status:   Future     Standing Expiration Date:   12/21/2021     Order Specific Question:   Reason for exam:     Answer:   left leg pain x 1 month, felt a a knot in back of thigh             AMIRAH Colon [address] [facility], DO

## 2020-12-24 ASSESSMENT — ENCOUNTER SYMPTOMS
NAUSEA: 0
ABDOMINAL PAIN: 0
EYE PAIN: 0
SHORTNESS OF BREATH: 0
VOMITING: 0

## 2020-12-24 NOTE — PATIENT INSTRUCTIONS
Cough, nasal congestion started yesterday.  Fever to 100.3 axillary, tylenol last at 9 am.  Fussy. Tolerating bottle feedings.  No diarrhea.    Mom concerned if ear infection.   Please call your pharmacy if you need any refills of your medication(s). Please call our office at [phone]  if you don't hear from us about your test results. Please be sure to call our office if your illness/problem has been treated for but has not completely resolved. Bring an accurate list of your medications with you at every appointment to ensure that we have the correct information.     Our office hours are: Monday - Friday 7 am- 5 pm

## 2020-12-30 ENCOUNTER — HOSPITAL ENCOUNTER (OUTPATIENT)
Dept: VASCULAR LAB | Age: 55
Discharge: HOME | End: 2020-12-30
Payer: COMMERCIAL

## 2020-12-30 PROCEDURE — 93971 EXTREMITY STUDY: CPT

## 2021-06-24 ENCOUNTER — HOSPITAL ENCOUNTER (EMERGENCY)
Age: 56
Discharge: HOME | End: 2021-06-24
Payer: COMMERCIAL

## 2021-06-24 ENCOUNTER — APPOINTMENT (OUTPATIENT)
Dept: GENERAL RADIOLOGY | Age: 56
End: 2021-06-24
Payer: COMMERCIAL

## 2021-06-24 ENCOUNTER — NURSE TRIAGE (OUTPATIENT)
Dept: OTHER | Facility: CLINIC | Age: 56
End: 2021-06-24

## 2021-06-24 VITALS
RESPIRATION RATE: 18 BRPM | DIASTOLIC BLOOD PRESSURE: 93 MMHG | SYSTOLIC BLOOD PRESSURE: 167 MMHG | HEART RATE: 80 BPM | BODY MASS INDEX: 37.77 KG/M2 | OXYGEN SATURATION: 99 % | WEIGHT: 255 LBS | TEMPERATURE: 98.5 F | HEIGHT: 69 IN

## 2021-06-24 DIAGNOSIS — R00.2 PALPITATIONS: Primary | ICD-10-CM

## 2021-06-24 DIAGNOSIS — R42 LIGHTHEADEDNESS: ICD-10-CM

## 2021-06-24 DIAGNOSIS — I10 HYPERTENSION, UNSPECIFIED TYPE: ICD-10-CM

## 2021-06-24 LAB
A/G RATIO: 1.4 (ref 1.1–2.2)
ALBUMIN SERPL-MCNC: 4.5 G/DL (ref 3.4–5)
ALP BLD-CCNC: 144 U/L (ref 40–129)
ALT SERPL-CCNC: 26 U/L (ref 10–40)
ANION GAP SERPL CALCULATED.3IONS-SCNC: 11 MMOL/L (ref 3–16)
AST SERPL-CCNC: 25 U/L (ref 15–37)
BASOPHILS ABSOLUTE: 0.1 K/UL (ref 0–0.2)
BASOPHILS RELATIVE PERCENT: 1 %
BILIRUB SERPL-MCNC: 0.4 MG/DL (ref 0–1)
BUN BLDV-MCNC: 10 MG/DL (ref 7–20)
CALCIUM SERPL-MCNC: 9.3 MG/DL (ref 8.3–10.6)
CHLORIDE BLD-SCNC: 101 MMOL/L (ref 99–110)
CO2: 25 MMOL/L (ref 21–32)
CREAT SERPL-MCNC: 0.6 MG/DL (ref 0.6–1.1)
EOSINOPHILS ABSOLUTE: 0.3 K/UL (ref 0–0.6)
EOSINOPHILS RELATIVE PERCENT: 3.1 %
GFR AFRICAN AMERICAN: >60
GFR NON-AFRICAN AMERICAN: >60
GLOBULIN: 3.3 G/DL
GLUCOSE BLD-MCNC: 122 MG/DL (ref 70–99)
HCT VFR BLD CALC: 36.5 % (ref 36–48)
HEMOGLOBIN: 12.4 G/DL (ref 12–16)
LYMPHOCYTES ABSOLUTE: 2.3 K/UL (ref 1–5.1)
LYMPHOCYTES RELATIVE PERCENT: 23.5 %
MAGNESIUM: 2.1 MG/DL (ref 1.8–2.4)
MCH RBC QN AUTO: 29.9 PG (ref 26–34)
MCHC RBC AUTO-ENTMCNC: 34.1 G/DL (ref 31–36)
MCV RBC AUTO: 87.7 FL (ref 80–100)
MONOCYTES ABSOLUTE: 0.5 K/UL (ref 0–1.3)
MONOCYTES RELATIVE PERCENT: 5.5 %
NEUTROPHILS ABSOLUTE: 6.5 K/UL (ref 1.7–7.7)
NEUTROPHILS RELATIVE PERCENT: 66.9 %
PDW BLD-RTO: 13.2 % (ref 12.4–15.4)
PLATELET # BLD: 319 K/UL (ref 135–450)
PMV BLD AUTO: 7.8 FL (ref 5–10.5)
POTASSIUM SERPL-SCNC: 3.9 MMOL/L (ref 3.5–5.1)
RBC # BLD: 4.16 M/UL (ref 4–5.2)
SODIUM BLD-SCNC: 137 MMOL/L (ref 136–145)
TOTAL PROTEIN: 7.8 G/DL (ref 6.4–8.2)
TROPONIN: <0.01 NG/ML
WBC # BLD: 9.8 K/UL (ref 4–11)

## 2021-06-24 PROCEDURE — 83735 ASSAY OF MAGNESIUM: CPT

## 2021-06-24 PROCEDURE — 71045 X-RAY EXAM CHEST 1 VIEW: CPT

## 2021-06-24 PROCEDURE — 99284 EMERGENCY DEPT VISIT MOD MDM: CPT

## 2021-06-24 PROCEDURE — 93005 ELECTROCARDIOGRAM TRACING: CPT

## 2021-06-24 PROCEDURE — 84443 ASSAY THYROID STIM HORMONE: CPT

## 2021-06-24 PROCEDURE — 84484 ASSAY OF TROPONIN QUANT: CPT

## 2021-06-24 PROCEDURE — 85025 COMPLETE CBC W/AUTO DIFF WBC: CPT

## 2021-06-24 PROCEDURE — 80053 COMPREHEN METABOLIC PANEL: CPT

## 2021-06-24 ASSESSMENT — ENCOUNTER SYMPTOMS
EYES NEGATIVE: 1
BACK PAIN: 0
ABDOMINAL PAIN: 0
COLOR CHANGE: 0
SHORTNESS OF BREATH: 1
COUGH: 0

## 2021-06-24 NOTE — TELEPHONE ENCOUNTER
Reason for Disposition   [6] Systolic BP  >= 211 OR Diastolic >= 915 AND [4] cardiac or neurologic symptoms (e.g., chest pain, difficulty breathing, unsteady gait, blurred vision)    Answer Assessment - Initial Assessment Questions  1. BLOOD PRESSURE: \"What is the blood pressure? \" \"Did you take at least two measurements 5 minutes apart? \"      Right arm 169/90    Left arm 146/78  2. ONSET: \"When did you take your blood pressure? \"      States symptoms have been going on for a few days    3. HOW: \"How did you obtain the blood pressure? \" (e.g., visiting nurse, automatic home BP monitor)      Automatic arm cuff    4. HISTORY: \"Do you have a history of high blood pressure? \"      Yes- has history of HTN    5. MEDICATIONS: Tono Ny you taking any medications for blood pressure? \" \"Have you missed any doses recently? \"      Does not take any medications for HTN currently. States was on medication a couple of years ago but lost weight and was taken off of them    6. OTHER SYMPTOMS: \"Do you have any symptoms? \" (e.g., headache, chest pain, blurred vision, difficulty breathing, weakness)      States can feel heart pounding, denies feeling like heart is racing. States also been experiencing intermittent chest pain and SOB, shakiness, and blurred vision. States vision is a little bit blurred now but not as bad as it is sometimes. States that for the last couple of days she just \"doesn't feel right. \"    7. PREGNANCY: \"Is there any chance you are pregnant? \" \"When was your last menstrual period? \"    Protocols used: HIGH BLOOD PRESSURE-ADULT-AH    Received call from Sparkle Alfonso at [facility] with Red Flag Complaint. Brief description of triage: Pt states that for last few days she \"doesn't feel right. \" Has been having intermittent chest pain, SOB, and can feel heart pounding. Also having intermittent blurry vision and shakiness. Has had some high BP readings over last few days.     Triage indicates for patient to Go to ED    Care advice provided, patient verbalizes understanding; denies any other questions or concerns; instructed to call back for any new or worsening symptoms.

## 2021-06-24 NOTE — ED PROVIDER NOTES
[facility]  ED  EMERGENCY DEPARTMENT ENCOUNTER        Pt Name: Edilia Ramirez  MRN: [id number]  Ankush 1965  Date of evaluation: 6/24/2021  Provider: Xenia Palmer PA-C  PCP: Jorge Luis Dukes DO  ED Attending: Luzma aMson      This patient was not seen by the attending provider     History provided by the patient    CHIEF COMPLAINT:     Chief Complaint   Patient presents with    Palpitations     started at 3p today. /90. HISTORY OF PRESENT ILLNESS:      Edilia Ramirez is a 55 y.o. female who arrives to the ED by private vehicle. Patient states she got her Pfizer Covid vaccine approximately 5 days ago. She is not really felt well or completely back to normal since then. She states she felt the worst the day after her vaccine. She has since had episodes of feeling a little lightheaded and at times short of breath though caveats this with the fact that she is always short of breath due to weight gain and deconditioning. She reports today she was experiencing a throbbing feeling that was diffuse throughout her body including in her chest, head and extremities. She felt like she could feel her heart beating. She had checked her blood pressure and found it to be elevated. She called primary care and was told to come to the ED. Patient has a history of hypertension. She had been on Cozaar but this was stopped in 2018. She had lost weight and no longer needed antihypertensives. However, since Covid the patient reports she is gaining her weight back and is not particularly surprised that her blood pressure is up. Patient denies pain of any kind. She denies any headache or chest pain. She denies abdominal pain. No identifiable exacerbating or alleviating factors to her symptoms. She is unsure if they are or are not related to recent Covid vaccine. Nursing Notes were reviewed     REVIEW OF SYSTEMS:     Review of Systems   Constitutional: Negative for chills and fever. HENT: Negative. Eyes: Negative. Respiratory: Positive for shortness of breath. Negative for cough. Cardiovascular: Positive for palpitations. Negative for chest pain. Gastrointestinal: Negative for abdominal pain. Genitourinary: Negative. Musculoskeletal: Negative for back pain and neck pain. Skin: Negative for color change. Neurological: Positive for light-headedness. Negative for weakness and headaches. All other systems reviewed and are negative. Except as noted above in the ROS, all other systems were reviewed and negative. PAST MEDICAL HISTORY:     Past Medical History:   Diagnosis Date    Bursitis     colonoscopy 03/06/2018    Dyspnea     GERD (gastroesophageal reflux disease)     Hypertension          SURGICAL HISTORY:      Past Surgical History:   Procedure Laterality Date    CHOLECYSTECTOMY      COLONOSCOPY  03/06/2018         CURRENT MEDICATIONS:       There are no discharge medications for this patient.         ALLERGIES:    Codeine    FAMILY HISTORY:       Family History   Problem Relation Age of Onset    Diabetes Father     Cancer Father     Colon Cancer Father     Mental Retardation Sister     Diabetes Sister     High Blood Pressure Brother     Diabetes Sister           SOCIAL HISTORY:       Social History     Socioeconomic History    Marital status:      Spouse name: None    Number of children: None    Years of education: None    Highest education level: None   Occupational History    None   Tobacco Use    Smoking status: Never Smoker    Smokeless tobacco: Never Used   Vaping Use    Vaping Use: Never used   Substance and Sexual Activity    Alcohol use: No    Drug use: No    Sexual activity: None   Other Topics Concern    None   Social History Narrative    None     Social Determinants of Health     Financial Resource Strain: Low Risk     Difficulty of Paying Living Expenses: Not hard at all   Food Insecurity: No Food Insecurity    Worried About [address] in the Last Year: Never true    Juany of Food in the Last Year: Never true   Transportation Needs:     Lack of Transportation (Medical):  Lack of Transportation (Non-Medical):    Physical Activity:     Days of Exercise per Week:     Minutes of Exercise per Session:    Stress:     Feeling of Stress :    Social Connections:     Frequency of Communication with Friends and Family:     Frequency of Social Gatherings with Friends and Family:     Attends [employer] Services:     Active Member of Clubs or Organizations:     Attends Club or Organization Meetings:     Marital Status:    Intimate Partner Violence:     Fear of Current or Ex-Partner:     Emotionally Abused:     Physically Abused:     Sexually Abused:        SCREENINGS:             PHYSICAL EXAM:       ED Triage Vitals [06/24/21 1644]   BP Temp Temp Source Pulse Resp SpO2 Height Weight   (!) 159/87 98.9 °F (37.2 °C) Oral 92 16 97 % 5' 9\" (1.753 m) 255 lb (115.7 kg)       Physical Exam    CONSTITUTIONAL: Awake and alert. Cooperative. Well-developed. Well-nourished. Non-toxic. No acute distress. HENT: Normocephalic. Atraumatic. External ears normal, without discharge. No nasal discharge. Oropharynx clear. Mucous membranes moist.  EYES: Conjunctiva non-injected. No scleral icterus. PERRL. EOM's grossly intact. NECK: Supple. Normal ROM. CARDIOVASCULAR: RRR. No Murmer. Intact distal pulses. PULMONARY/CHEST WALL: Effort normal. No tachypnea. Lungs clear to ausculation. ABDOMEN: Normal BS. Soft. Nondistended. No tenderness to palpate. No guarding. /ANORECTAL: Not assessed  MUSKULOSKELETAL: Normal ROM. No acute deformities. No edema. No tenderness to palpate. SKIN: Warm and dry. No rash. NEUROLOGICAL: Alert and oriented x 3. GCS 15. CN II-XII grossly intact. Strength is 5/5 in all extremities and sensation is intact. Normal gait.    PSYCHIATRIC: Normal affect        DIAGNOSTICRESULTS:     LABS:    Results for orders placed or performed during the hospital encounter of 06/24/21   CBC Auto Differential   Result Value Ref Range    WBC 9.8 4.0 - 11.0 K/uL    RBC 4.16 4.00 - 5.20 M/uL    Hemoglobin 12.4 12.0 - 16.0 g/dL    Hematocrit 36.5 36.0 - 48.0 %    MCV 87.7 80.0 - 100.0 fL    MCH 29.9 26.0 - 34.0 pg    MCHC 34.1 31.0 - 36.0 g/dL    RDW 13.2 12.4 - 15.4 %    Platelets 664 282 - 702 K/uL    MPV 7.8 5.0 - 10.5 fL    Neutrophils % 66.9 %    Lymphocytes % 23.5 %    Monocytes % 5.5 %    Eosinophils % 3.1 %    Basophils % 1.0 %    Neutrophils Absolute 6.5 1.7 - 7.7 K/uL    Lymphocytes Absolute 2.3 1.0 - 5.1 K/uL    Monocytes Absolute 0.5 0.0 - 1.3 K/uL    Eosinophils Absolute 0.3 0.0 - 0.6 K/uL    Basophils Absolute 0.1 0.0 - 0.2 K/uL   Comprehensive metabolic panel   Result Value Ref Range    Sodium 137 136 - 145 mmol/L    Potassium 3.9 3.5 - 5.1 mmol/L    Chloride 101 99 - 110 mmol/L    CO2 25 21 - 32 mmol/L    Anion Gap 11 3 - 16    Glucose 122 (H) 70 - 99 mg/dL    BUN 10 7 - 20 mg/dL    CREATININE 0.6 0.6 - 1.1 mg/dL    GFR Non-African American >60 >60    GFR African American >60 >60    Calcium 9.3 8.3 - 10.6 mg/dL    Total Protein 7.8 6.4 - 8.2 g/dL    Albumin 4.5 3.4 - 5.0 g/dL    Albumin/Globulin Ratio 1.4 1.1 - 2.2    Total Bilirubin 0.4 0.0 - 1.0 mg/dL    Alkaline Phosphatase 144 (H) 40 - 129 U/L    ALT 26 10 - 40 U/L    AST 25 15 - 37 U/L    Globulin 3.3 g/dL   Magnesium   Result Value Ref Range    Magnesium 2.10 1.80 - 2.40 mg/dL   Troponin   Result Value Ref Range    Troponin <0.01 <0.01 ng/mL   EKG 12 Lead   Result Value Ref Range    Ventricular Rate 88 BPM    Atrial Rate 88 BPM    P-R Interval 150 ms    QRS Duration 86 ms    Q-T Interval 374 ms    QTc Calculation (Bazett) 452 ms    P Axis 58 degrees    R Axis 11 degrees    T Axis 25 degrees    Diagnosis       Normal sinus rhythmPossible Left atrial enlargementNonspecific ST abnormalityAbnormal ECGWhen compared with ECG of 20-MAR-2019 09:14,No significant change was found         RADIOLOGY:  All x-ray studies areviewed/reviewed by me. Formal interpretations per the radiologist are as follows:    XR CHEST PORTABLE    Result Date: 6/24/2021  EXAMINATION: ONE XRAY VIEW OF THE CHEST 6/24/2021 7:22 pm COMPARISON: Chest x-ray dated 09/17/2019 HISTORY: ORDERING SYSTEM PROVIDED HISTORY: SOB, palpitations TECHNOLOGIST PROVIDED HISTORY: Reason for exam:->SOB, palpitations Reason for Exam: sob, palpitations Acuity: Acute Type of Exam: Initial FINDINGS: HEART/MEDIASTINUM: The cardiomediastinal silhouette is within normal limits. PLEURA/LUNGS: There are no focal consolidations or pleural effusions. There is no appreciable pneumothorax. BONES/SOFT TISSUE: No acute abnormality. No radiographic evidence of acute pulmonary disease. EKG: The Ekg interpreted by me in the absence of a cardiologist shows. normal sinus rhythm with a rate of 88    See also interpretation by Ho Murray      PROCEDURES:   N/A    CRITICAL CARE TIME:       None      CONSULTS:  None      EMERGENCY DEPARTMENT COURSE and DIFFERENTIAL DIAGNOSIS/MDM:   Vitals:    Vitals:    06/24/21 1850 06/24/21 1911 06/24/21 2027 06/24/21 2030   BP: (!) 187/97  (!) 167/93 (!) 167/93   Pulse: 91 85 80    Resp: 18      Temp: 98.5 °F (36.9 °C)      TempSrc:       SpO2: 98% 99% 99% 99%   Weight:       Height:           Patient was given the following medications:  None    I have evaluated this patient in the ED  Old records were reviewed. Patient arrives to the emergency department describing vague symptoms for several days since having her second Covid shot. She describes a little lightheadedness and shortness of breath. Today she reported feeling a \"throbbing\" which she says is not pain but rather a sense that she could feel her heart beating throughout her body. She called primary care and was sent in. Patient is noted to be hypertensive on arrival in the 150s over 80s.   She has a second pressure read in the 180s over 90s and ultimately prior to discharge has a blood pressure in the 160s over 90s. She is not having any pain. She is actually not had any symptoms or discomfort while in the emergency department. Her EKG on arrival shows sinus rhythm with rate 88 bpm.  This was read by my attending, Dr. Rosaura Benton  CBC reveals normal white count of 9.8 with H&H 12.4 and 36.5  CMP normal  Troponin negative  TSH sent  Portable chest x-ray no radiographic evidence of acute pulmonary disease  I reassessed patient. Again she had her blood pressure checked multiple times here. It is elevated but not significantly. We talked about the fact that she likely needs to be back on antihypertensives due to the fact that she required them in the past and has since gained weight back. She has good PCP follow-up and we agreed that we would hold off on starting a antihypertensive in the ED. We will have her check her blood pressure at home several times over the next few days. Her overall presentation is somewhat atypical and she believes may be in part due to recent Covid vaccine. I sat and talked with her and her  at length, reviewing all results. I answered all questions. She will go home with plans to follow-up with primary care in the next week. I estimate there is LOW risk for SEPSIS, ACUTE CORONARY SYNDROME, MALIGNANT DYSRHYTHMIA or HYPERTENSION, PULMONARY EMBOLISM, THORACIC AORTIC DISSECTION, INTRACRANIAL HEMORRHAGE, SUBARACHNOID HEMORRHAGE, SUBDURAL HEMATOMA or STROKE, thus I consider the discharge disposition reasonable. Yakelin Pérez and I have discussed the diagnosis and risks, and we agree with discharging home to follow-up with their primary doctor. We also discussed returning to the Emergency Department immediately if new or worsening symptoms occur.  We have discussed the symptoms which are most concerning (e.g., fever, bloody sputum, worsening pain or shortness of breath, weakness, persistent vomiting) that necessitate immediate return. FINAL IMPRESSION:      1. Palpitations    2. Lightheadedness    3. Hypertension, unspecified type          DISPOSITION/PLAN:   DISPOSITION     DISCHARGE    PATIENT REFERRED TO:  Sofi Amaral DO  [address] [place] [address] [address]  [address]  [phone]    Schedule an appointment as soon as possible for a visit       [facility]  ED  [address]  Go to   If symptoms worsen      DISCHARGE MEDICATIONS:  There are no discharge medications for this patient.                  (Please note thatportions of this note were completed with a voice recognition program.  Efforts were made to edit the dictations, but occasionally words are mis-transcribed.)    Kody Bean PA-C (electronicallysigned)              [place], [place]  06/25/21 0127

## 2021-06-25 LAB
EKG ATRIAL RATE: 88 BPM
EKG DIAGNOSIS: NORMAL
EKG P AXIS: 58 DEGREES
EKG P-R INTERVAL: 150 MS
EKG Q-T INTERVAL: 374 MS
EKG QRS DURATION: 86 MS
EKG QTC CALCULATION (BAZETT): 452 MS
EKG R AXIS: 11 DEGREES
EKG T AXIS: 25 DEGREES
EKG VENTRICULAR RATE: 88 BPM
TSH REFLEX: 2.05 UIU/ML (ref 0.27–4.2)

## 2021-06-25 PROCEDURE — 93010 ELECTROCARDIOGRAM REPORT: CPT | Performed by: INTERNAL MEDICINE

## 2021-06-25 NOTE — ED PROVIDER NOTES
I did not see this patient personally. I only interpreted their EKG.   Reveals:  Normal sinus rhythm  No ST or t wave changes   Heart rate 88    Similar to prior EKG performed in 3/19  Results communicated to Saúl Fields MD  06/24/21 2031

## 2021-06-28 ENCOUNTER — OFFICE VISIT (OUTPATIENT)
Dept: FAMILY MEDICINE CLINIC | Age: 56
End: 2021-06-28
Payer: COMMERCIAL

## 2021-06-28 VITALS
DIASTOLIC BLOOD PRESSURE: 86 MMHG | WEIGHT: 252 LBS | OXYGEN SATURATION: 98 % | SYSTOLIC BLOOD PRESSURE: 142 MMHG | BODY MASS INDEX: 37.33 KG/M2 | HEART RATE: 74 BPM | HEIGHT: 69 IN

## 2021-06-28 DIAGNOSIS — I10 ESSENTIAL HYPERTENSION: Primary | ICD-10-CM

## 2021-06-28 PROCEDURE — 3017F COLORECTAL CA SCREEN DOC REV: CPT | Performed by: FAMILY MEDICINE

## 2021-06-28 PROCEDURE — G8427 DOCREV CUR MEDS BY ELIG CLIN: HCPCS | Performed by: FAMILY MEDICINE

## 2021-06-28 PROCEDURE — 99213 OFFICE O/P EST LOW 20 MIN: CPT | Performed by: FAMILY MEDICINE

## 2021-06-28 PROCEDURE — 1036F TOBACCO NON-USER: CPT | Performed by: FAMILY MEDICINE

## 2021-06-28 PROCEDURE — G8417 CALC BMI ABV UP PARAM F/U: HCPCS | Performed by: FAMILY MEDICINE

## 2021-06-28 RX ORDER — LOSARTAN POTASSIUM 50 MG/1
50 TABLET ORAL DAILY
Qty: 90 TABLET | Refills: 1 | Status: SHIPPED | OUTPATIENT
Start: 2021-06-28 | End: 2022-01-03 | Stop reason: SDUPTHER

## 2021-06-28 ASSESSMENT — ENCOUNTER SYMPTOMS
EYE PAIN: 0
VOMITING: 0
CHEST TIGHTNESS: 0
SHORTNESS OF BREATH: 0
ABDOMINAL PAIN: 0
NAUSEA: 0

## 2021-06-28 ASSESSMENT — PATIENT HEALTH QUESTIONNAIRE - PHQ9
SUM OF ALL RESPONSES TO PHQ QUESTIONS 1-9: 0
SUM OF ALL RESPONSES TO PHQ9 QUESTIONS 1 & 2: 0
1. LITTLE INTEREST OR PLEASURE IN DOING THINGS: 0
SUM OF ALL RESPONSES TO PHQ QUESTIONS 1-9: 0
SUM OF ALL RESPONSES TO PHQ QUESTIONS 1-9: 0
2. FEELING DOWN, DEPRESSED OR HOPELESS: 0

## 2021-06-28 NOTE — PATIENT INSTRUCTIONS

## 2021-06-28 NOTE — PROGRESS NOTES
Subjective:      Patient ID: Christiano Chatman is a 55 y.o. female. HPI  Chief Complaint   Patient presents with    Follow-Up from Hospital     Patient is here for an ER follow up, she was treated and released from [place] [address] on 6/24/21     Here for f/u from ED. BP high and had heart palpitations  Not sure if related to recent COVID vaccine  Had stopped BP med in 2019 due to weight loss. Has regained weight  Denies CP  Vitals:    06/28/21 0842 06/28/21 0844   BP: (!) 144/90 (!) 142/86   Site: Right Upper Arm Left Upper Arm   Position: Sitting Sitting   Pulse: 74    SpO2: 98%    Weight: 252 lb (114.3 kg)    Height: 5' 9\" (1.753 m)      Body mass index is 37.21 kg/m². Wt Readings from Last 3 Encounters:   06/28/21 252 lb (114.3 kg)   06/24/21 255 lb (115.7 kg)   12/21/20 252 lb 6.4 oz (114.5 kg)     BP Readings from Last 3 Encounters:   06/28/21 (!) 142/86   06/24/21 (!) 167/93   12/21/20 138/82        Review of Systems   Constitutional: Negative for chills and fever. Eyes: Negative for pain and visual disturbance. Respiratory: Negative for chest tightness and shortness of breath. Cardiovascular: Positive for palpitations. Negative for chest pain. Gastrointestinal: Negative for abdominal pain, nausea and vomiting. Neurological: Negative for dizziness, syncope and headaches. Objective:   Physical Exam  Constitutional:       General: She is not in acute distress. Appearance: Normal appearance. She is well-developed. She is obese. HENT:      Head: Normocephalic and atraumatic. Cardiovascular:      Rate and Rhythm: Normal rate and regular rhythm. Heart sounds: Normal heart sounds. No murmur heard. Pulmonary:      Effort: Pulmonary effort is normal.      Breath sounds: Normal breath sounds. No wheezing. Abdominal:      General: Bowel sounds are normal. There is no distension. Palpations: Abdomen is soft. Tenderness: There is no abdominal tenderness.  There is no guarding or rebound. Musculoskeletal:      Cervical back: Neck supple. Skin:     General: Skin is warm. Coloration: Skin is not pale. Neurological:      General: No focal deficit present. Mental Status: She is oriented to person, place, and time.    Psychiatric:         Mood and Affect: Mood normal.         Behavior: Behavior normal.         Assessment:      htn- restart med, will start with 25mg      Plan:      Orders Placed This Encounter   Medications    losartan (COZAAR) 50 MG tablet     Sig: Take 1 tablet by mouth daily     Dispense:  90 tablet     Refill:  1             AMIRAH Colon [address],

## 2021-12-31 ENCOUNTER — HOSPITAL ENCOUNTER (EMERGENCY)
Age: 56
Discharge: HOME | End: 2021-12-31
Attending: EMERGENCY MEDICINE
Payer: COMMERCIAL

## 2021-12-31 ENCOUNTER — APPOINTMENT (OUTPATIENT)
Dept: CT IMAGING | Age: 56
End: 2021-12-31
Payer: COMMERCIAL

## 2021-12-31 VITALS
HEART RATE: 78 BPM | DIASTOLIC BLOOD PRESSURE: 88 MMHG | TEMPERATURE: 98 F | BODY MASS INDEX: 37.03 KG/M2 | WEIGHT: 250 LBS | HEIGHT: 69 IN | OXYGEN SATURATION: 96 % | SYSTOLIC BLOOD PRESSURE: 146 MMHG | RESPIRATION RATE: 20 BRPM

## 2021-12-31 DIAGNOSIS — N30.00 ACUTE CYSTITIS WITHOUT HEMATURIA: Primary | ICD-10-CM

## 2021-12-31 LAB
A/G RATIO: 1.1 (ref 1.1–2.2)
ALBUMIN SERPL-MCNC: 4.2 G/DL (ref 3.4–5)
ALP BLD-CCNC: 127 U/L (ref 40–129)
ALT SERPL-CCNC: 23 U/L (ref 10–40)
ANION GAP SERPL CALCULATED.3IONS-SCNC: 14 MMOL/L (ref 3–16)
AST SERPL-CCNC: 22 U/L (ref 15–37)
BACTERIA: ABNORMAL /HPF
BASOPHILS ABSOLUTE: 0 K/UL (ref 0–0.2)
BASOPHILS RELATIVE PERCENT: 0.4 %
BILIRUB SERPL-MCNC: 0.4 MG/DL (ref 0–1)
BILIRUBIN URINE: NEGATIVE
BLOOD, URINE: ABNORMAL
BUN BLDV-MCNC: 8 MG/DL (ref 7–20)
CALCIUM SERPL-MCNC: 9.5 MG/DL (ref 8.3–10.6)
CHLORIDE BLD-SCNC: 101 MMOL/L (ref 99–110)
CLARITY: CLEAR
CO2: 20 MMOL/L (ref 21–32)
COLOR: YELLOW
CREAT SERPL-MCNC: 0.6 MG/DL (ref 0.6–1.1)
EOSINOPHILS ABSOLUTE: 0.1 K/UL (ref 0–0.6)
EOSINOPHILS RELATIVE PERCENT: 0.5 %
EPITHELIAL CELLS, UA: ABNORMAL /HPF (ref 0–5)
GFR AFRICAN AMERICAN: >60
GFR NON-AFRICAN AMERICAN: >60
GLUCOSE BLD-MCNC: 122 MG/DL (ref 70–99)
GLUCOSE URINE: NEGATIVE MG/DL
HCT VFR BLD CALC: 35.3 % (ref 36–48)
HEMOGLOBIN: 11.9 G/DL (ref 12–16)
KETONES, URINE: NEGATIVE MG/DL
LEUKOCYTE ESTERASE, URINE: ABNORMAL
LIPASE: 17 U/L (ref 13–60)
LYMPHOCYTES ABSOLUTE: 1.6 K/UL (ref 1–5.1)
LYMPHOCYTES RELATIVE PERCENT: 15.7 %
MCH RBC QN AUTO: 29.7 PG (ref 26–34)
MCHC RBC AUTO-ENTMCNC: 33.6 G/DL (ref 31–36)
MCV RBC AUTO: 88.5 FL (ref 80–100)
MICROSCOPIC EXAMINATION: YES
MONOCYTES ABSOLUTE: 0.6 K/UL (ref 0–1.3)
MONOCYTES RELATIVE PERCENT: 5.8 %
NEUTROPHILS ABSOLUTE: 7.9 K/UL (ref 1.7–7.7)
NEUTROPHILS RELATIVE PERCENT: 77.6 %
NITRITE, URINE: POSITIVE
PDW BLD-RTO: 13 % (ref 12.4–15.4)
PH UA: 6 (ref 5–8)
PLATELET # BLD: 296 K/UL (ref 135–450)
PMV BLD AUTO: 7.6 FL (ref 5–10.5)
POTASSIUM REFLEX MAGNESIUM: 4 MMOL/L (ref 3.5–5.1)
PROTEIN UA: 30 MG/DL
RBC # BLD: 3.99 M/UL (ref 4–5.2)
RBC UA: ABNORMAL /HPF (ref 0–4)
SODIUM BLD-SCNC: 135 MMOL/L (ref 136–145)
SPECIFIC GRAVITY UA: 1.02 (ref 1–1.03)
TOTAL PROTEIN: 7.9 G/DL (ref 6.4–8.2)
URINE REFLEX TO CULTURE: YES
URINE TYPE: ABNORMAL
UROBILINOGEN, URINE: 0.2 E.U./DL
WBC # BLD: 10.1 K/UL (ref 4–11)
WBC UA: >100 /HPF (ref 0–5)

## 2021-12-31 PROCEDURE — 74176 CT ABD & PELVIS W/O CONTRAST: CPT

## 2021-12-31 PROCEDURE — 96374 THER/PROPH/DIAG INJ IV PUSH: CPT

## 2021-12-31 PROCEDURE — 80053 COMPREHEN METABOLIC PANEL: CPT

## 2021-12-31 PROCEDURE — 87184 SC STD DISK METHOD PER PLATE: CPT

## 2021-12-31 PROCEDURE — 81001 URINALYSIS AUTO W/SCOPE: CPT

## 2021-12-31 PROCEDURE — 87186 SC STD MICRODIL/AGAR DIL: CPT

## 2021-12-31 PROCEDURE — 6370000000 HC RX 637 (ALT 250 FOR IP): Performed by: EMERGENCY MEDICINE

## 2021-12-31 PROCEDURE — 6360000002 HC RX W HCPCS: Performed by: EMERGENCY MEDICINE

## 2021-12-31 PROCEDURE — 96375 TX/PRO/DX INJ NEW DRUG ADDON: CPT

## 2021-12-31 PROCEDURE — 85025 COMPLETE CBC W/AUTO DIFF WBC: CPT

## 2021-12-31 PROCEDURE — 87086 URINE CULTURE/COLONY COUNT: CPT

## 2021-12-31 PROCEDURE — 2580000003 HC RX 258: Performed by: EMERGENCY MEDICINE

## 2021-12-31 PROCEDURE — 99284 EMERGENCY DEPT VISIT MOD MDM: CPT

## 2021-12-31 PROCEDURE — 87077 CULTURE AEROBIC IDENTIFY: CPT

## 2021-12-31 PROCEDURE — 83690 ASSAY OF LIPASE: CPT

## 2021-12-31 RX ORDER — DICYCLOMINE HYDROCHLORIDE 10 MG/1
10 CAPSULE ORAL ONCE
Status: COMPLETED | OUTPATIENT
Start: 2021-12-31 | End: 2021-12-31

## 2021-12-31 RX ORDER — ONDANSETRON 2 MG/ML
4 INJECTION INTRAMUSCULAR; INTRAVENOUS
Status: DISCONTINUED | OUTPATIENT
Start: 2021-12-31 | End: 2021-12-31 | Stop reason: HOSPADM

## 2021-12-31 RX ORDER — SULFAMETHOXAZOLE AND TRIMETHOPRIM 800; 160 MG/1; MG/1
1 TABLET ORAL 2 TIMES DAILY
Qty: 14 TABLET | Refills: 0 | Status: SHIPPED | OUTPATIENT
Start: 2021-12-31 | End: 2022-01-07

## 2021-12-31 RX ORDER — SULFAMETHOXAZOLE AND TRIMETHOPRIM 800; 160 MG/1; MG/1
1 TABLET ORAL ONCE
Status: COMPLETED | OUTPATIENT
Start: 2021-12-31 | End: 2021-12-31

## 2021-12-31 RX ORDER — IBUPROFEN 600 MG/1
600 TABLET ORAL EVERY 6 HOURS PRN
Qty: 40 TABLET | Refills: 0 | Status: ON HOLD | OUTPATIENT
Start: 2021-12-31 | End: 2022-06-15 | Stop reason: HOSPADM

## 2021-12-31 RX ORDER — KETOROLAC TROMETHAMINE 30 MG/ML
30 INJECTION, SOLUTION INTRAMUSCULAR; INTRAVENOUS ONCE
Status: COMPLETED | OUTPATIENT
Start: 2021-12-31 | End: 2021-12-31

## 2021-12-31 RX ORDER — SODIUM CHLORIDE, SODIUM LACTATE, POTASSIUM CHLORIDE, AND CALCIUM CHLORIDE .6; .31; .03; .02 G/100ML; G/100ML; G/100ML; G/100ML
1000 INJECTION, SOLUTION INTRAVENOUS ONCE
Status: COMPLETED | OUTPATIENT
Start: 2021-12-31 | End: 2021-12-31

## 2021-12-31 RX ADMIN — SULFAMETHOXAZOLE AND TRIMETHOPRIM 1 TABLET: 800; 160 TABLET ORAL at 08:18

## 2021-12-31 RX ADMIN — DICYCLOMINE HYDROCHLORIDE 10 MG: 10 CAPSULE ORAL at 05:49

## 2021-12-31 RX ADMIN — KETOROLAC TROMETHAMINE 30 MG: 30 INJECTION, SOLUTION INTRAMUSCULAR at 06:50

## 2021-12-31 RX ADMIN — SODIUM CHLORIDE, POTASSIUM CHLORIDE, SODIUM LACTATE AND CALCIUM CHLORIDE 1000 ML: 600; 310; 30; 20 INJECTION, SOLUTION INTRAVENOUS at 05:50

## 2021-12-31 RX ADMIN — ONDANSETRON 4 MG: 2 INJECTION INTRAMUSCULAR; INTRAVENOUS at 05:49

## 2021-12-31 ASSESSMENT — PAIN SCALES - GENERAL: PAINLEVEL_OUTOF10: 10

## 2021-12-31 ASSESSMENT — PAIN DESCRIPTION - LOCATION: LOCATION: ABDOMEN

## 2021-12-31 NOTE — ED PROVIDER NOTES
Physician-In-Triage Note    I performed a medical screening examination and evaluated this patient briefly with the purpose of initiating their ED workup in an expeditious manner. Please see notes from other ED providers regarding comprehensive evaluation including full history, physical exam, interpretation of results, and medical decision making/disposition. In brief, Casey Junior is a 55 y.o. female who presents to the ED complaining of lower abdominal pain, sometimes LLQ RLQ or suprapubic, but not upper. +nausea, no vomiting.  +diarrhea. Onset yesterday afternoon. Focused physical examination notable for no acute distress, well-appearing, well-nourished, normal speech and mentation without obvious facial droop, no obvious rash. No obvious cranial nerve deficits on my initial exam.  Mild to moderate diffusely lower abd ttp without peritonitis, no upper abd ttp or distention. Vitals reviewed per nursing documentation. Triage orders placed, including labs urine CT NPO and medications    I did not personally review any of the results of these tests, which will be reviewed and interpreted later by ED providers at the time of the patient's more comprehensive evaluation.         Paul Burt MD  12/31/21 3014

## 2022-01-03 LAB
ORGANISM: ABNORMAL
URINE CULTURE, ROUTINE: ABNORMAL

## 2022-01-03 RX ORDER — LOSARTAN POTASSIUM 50 MG/1
50 TABLET ORAL DAILY
Qty: 90 TABLET | Refills: 1 | Status: ON HOLD | OUTPATIENT
Start: 2022-01-03 | End: 2022-06-15

## 2022-01-03 NOTE — TELEPHONE ENCOUNTER
Isaacignnaandrea [facility] [phone] (home)    is requesting refill(s) of medication Losartan   [facility] [place] [place]     Last [address] 10-23-20 (pertaining to medication)   Last refill 06-28-21 (per medication requested)  Next office visit scheduled or attempted Yes  Date 01-20-22  If No, reason made

## 2022-01-03 NOTE — TELEPHONE ENCOUNTER
Patient states she is going to keep seeing , she says that everyone they were referred to is not accepting new patients and she needs a doctor so she has to keep coming here.   SHe is currently sick and is going to get tested prior to make sure she is negative for Covid, did explain that it needs to be a PCR and not an at home rapid test.

## 2022-01-03 NOTE — TELEPHONE ENCOUNTER
Is she finding a new PCP? I remember her daughter calling and stating the whole family was finding someone new due to the fact that [facility] does not have any testing sites for Covid when she needed it.   They expressed their frustration with our system

## 2022-01-04 NOTE — ED PROVIDER NOTES
Patient initially seen and evaluated in triage by Dr. Ely Vines. CHIEF COMPLAINT  Abdominal Pain (Patient arrives via walk in with c/o Lower abdominal pain. Patient states this started at 1500 12/30/21. )      HISTORY OF PRESENT ILLNESS  Peri Noel is a 55 y.o. female with a history of hypertension, GERD, dyspnea, and bursitis who presents to the ED complaining of abdominal pain. Patient reports developing increasing lower abdominal pain starting approximately 3 PM on 12/30/2021. Patient reports nausea without vomiting. Diarrhea has also been noted. No fevers, chills, or sweats. No flank pain reported. .   No other complaints, modifying factors or associated symptoms. I have reviewed the following from the nursing documentation.     Past Medical History:   Diagnosis Date    Bursitis     colonoscopy 03/06/2018    Dyspnea     GERD (gastroesophageal reflux disease)     Hypertension      Past Surgical History:   Procedure Laterality Date    CHOLECYSTECTOMY      COLONOSCOPY  03/06/2018     Family History   Problem Relation Age of Onset    Diabetes Father     Cancer Father     Colon Cancer Father     Mental Retardation Sister     Diabetes Sister     High Blood Pressure Brother     Diabetes Sister      Social History     Socioeconomic History    Marital status:      Spouse name: Not on file    Number of children: Not on file    Years of education: Not on file    Highest education level: Not on file   Occupational History    Not on file   Tobacco Use    Smoking status: Never Smoker    Smokeless tobacco: Never Used   Vaping Use    Vaping Use: Never used   Substance and Sexual Activity    Alcohol use: No    Drug use: No    Sexual activity: Not on file   Other Topics Concern    Not on file   Social History Narrative    Not on file     Social Determinants of Health     Financial Resource Strain:     Difficulty of Paying Living Expenses: Not on file   Food Insecurity:     SpO2 96%   BMI 36.92 kg/m²   GENERAL APPEARANCE: Awake and alert. Cooperative. No acute distress. HEAD: Normocephalic. Atraumatic. EYES: PERRL. EOM's grossly intact. ENT: Mucous membranes are moist.   NECK: Supple, trachea midline. HEART: RRR. Normal S1, S2. No murmurs, rubs or gallops. LUNGS: Respirations unlabored. CTAB. Good air exchange. No wheezes, rales, or rhonchi. Speaking comfortably in full sentences. ABDOMEN: Soft. Non-distended. Diffuse lower abdominal tenderness to palpation. No CVA tenderness. No guarding or rebound. Normal Bowel sounds. EXTREMITIES: No peripheral edema. MAEE. No acute deformities. SKIN: Warm and dry. No acute rashes. NEUROLOGICAL: Alert and oriented X 3. CN II-XII intact. No gross facial drooping. Strength 5/5, sensation intact. No pronator drift. Normal coordination. Gait normal.   PSYCHIATRIC: Normal mood and affect. LABS  I have reviewed all labs for this visit.    Results for orders placed or performed during the hospital encounter of 12/31/21   Culture, Urine    Specimen: Urine, clean catch   Result Value Ref Range    Organism Klebsiella aerogenes (A)     Urine Culture, Routine 50,000 CFU/ml        Susceptibility    Klebsiella aerogenes - BACTERIAL SUSCEPTIBILITY PANEL BY MICHAEL     amoxicillin-clavulanate >16/8 Resistant mcg/mL     ampicillin >16 Resistant mcg/mL     ampicillin-sulbactam <=8/4 Resistant mcg/mL     ceFAZolin >16 Resistant mcg/mL     cefepime <=2 Sensitive mcg/mL     cefTRIAXone <=1 Sensitive mcg/mL     cefuroxime <=4 Resistant mcg/mL     ciprofloxacin <=1 Sensitive mcg/mL     ertapenem <=0.5 Sensitive mcg/mL     gentamicin <=4 Sensitive mcg/mL     meropenem <=1 Sensitive mcg/mL     nitrofurantoin >64 Resistant mcg/mL     piperacillin-tazobactam <=16 Sensitive mcg/mL     trimethoprim-sulfamethoxazole <=2/38 Sensitive mcg/mL   Urinalysis Reflex to Culture    Specimen: Urine, clean catch   Result Value Ref Range    Color, UA Yellow Straw/Yellow Clarity, UA Clear Clear    Glucose, Ur Negative Negative mg/dL    Bilirubin Urine Negative Negative    Ketones, Urine Negative Negative mg/dL    Specific Gravity, UA 1.025 1.005 - 1.030    Blood, Urine MODERATE (A) Negative    pH, UA 6.0 5.0 - 8.0    Protein, UA 30 (A) Negative mg/dL    Urobilinogen, Urine 0.2 <2.0 E.U./dL    Nitrite, Urine POSITIVE (A) Negative    Leukocyte Esterase, Urine LARGE (A) Negative    Microscopic Examination YES     Urine Type NotGiven     Urine Reflex to Culture Yes    CBC Auto Differential   Result Value Ref Range    WBC 10.1 4.0 - 11.0 K/uL    RBC 3.99 (L) 4.00 - 5.20 M/uL    Hemoglobin 11.9 (L) 12.0 - 16.0 g/dL    Hematocrit 35.3 (L) 36.0 - 48.0 %    MCV 88.5 80.0 - 100.0 fL    MCH 29.7 26.0 - 34.0 pg    MCHC 33.6 31.0 - 36.0 g/dL    RDW 13.0 12.4 - 15.4 %    Platelets 346 123 - 588 K/uL    MPV 7.6 5.0 - 10.5 fL    Neutrophils % 77.6 %    Lymphocytes % 15.7 %    Monocytes % 5.8 %    Eosinophils % 0.5 %    Basophils % 0.4 %    Neutrophils Absolute 7.9 (H) 1.7 - 7.7 K/uL    Lymphocytes Absolute 1.6 1.0 - 5.1 K/uL    Monocytes Absolute 0.6 0.0 - 1.3 K/uL    Eosinophils Absolute 0.1 0.0 - 0.6 K/uL    Basophils Absolute 0.0 0.0 - 0.2 K/uL   Comprehensive Metabolic Panel w/ Reflex to MG   Result Value Ref Range    Sodium 135 (L) 136 - 145 mmol/L    Potassium reflex Magnesium 4.0 3.5 - 5.1 mmol/L    Chloride 101 99 - 110 mmol/L    CO2 20 (L) 21 - 32 mmol/L    Anion Gap 14 3 - 16    Glucose 122 (H) 70 - 99 mg/dL    BUN 8 7 - 20 mg/dL    CREATININE 0.6 0.6 - 1.1 mg/dL    GFR Non-African American >60 >60    GFR African American >60 >60    Calcium 9.5 8.3 - 10.6 mg/dL    Total Protein 7.9 6.4 - 8.2 g/dL    Albumin 4.2 3.4 - 5.0 g/dL    Albumin/Globulin Ratio 1.1 1.1 - 2.2    Total Bilirubin 0.4 0.0 - 1.0 mg/dL    Alkaline Phosphatase 127 40 - 129 U/L    ALT 23 10 - 40 U/L    AST 22 15 - 37 U/L   Lipase   Result Value Ref Range    Lipase 17.0 13.0 - 60.0 U/L   Microscopic Urinalysis   Result Value Ref Range    WBC, UA >100 (A) 0 - 5 /HPF    RBC, UA 21-50 (A) 0 - 4 /HPF    Epithelial Cells, UA 6-10 (A) 0 - 5 /HPF    Bacteria, UA 2+ (A) None Seen /HPF         RADIOLOGY  X-RAYS:  I have reviewed radiologic plain film image(s). ALL OTHER NON-PLAIN FILM IMAGES SUCH AS CT, ULTRASOUND AND MRI HAVE BEEN READ BY THE RADIOLOGIST. CT ABDOMEN PELVIS WO CONTRAST Additional Contrast? None   Final Result   1. Perivesicular inflammatory changes suggestive of cystitis. Correlate with   urinalysis. 2. Minimally increased size and complexity of a lipomatous lesion in the left   hip soft tissues , favored to be benign given the small change over time. Recommend clinical follow-up to ensure stability in size over time. If the   patient endorses an increase in size or pain, this lesion can be re-evaluated   by ultrasound or MRI. Rechecks: Physical assessment performed. Patient stable throughout her stay. ED COURSE/MDM  Patient seen and evaluated. Old records reviewed. Labs and imaging reviewed and results discussed with patient. Patient stable throughout her stay. Patient's labs reveal concern for UTI. Patient was reassessed as noted above . No acute pathology was noted and pt is safe for discharge home to follow up with PCP. Plan of care discussed with patient and family. Patient and family in agreement with plan. Patient was given scripts for the following medications. I counseled patient how to take these medications. Discharge Medication List as of 12/31/2021  8:15 AM      START taking these medications    Details   sulfamethoxazole-trimethoprim (BACTRIM DS;SEPTRA DS) 800-160 MG per tablet Take 1 tablet by mouth 2 times daily for 7 days, Disp-14 tablet, R-0Print      ibuprofen (IBU) 600 MG tablet Take 1 tablet by mouth every 6 hours as needed for Pain, Disp-40 tablet, R-0Print             CLINICAL IMPRESSION  1.  Acute cystitis without hematuria        Blood pressure (!) 146/88, pulse 78, temperature 98 °F (36.7 °C), temperature source Oral, resp. rate 20, height 5' 9\" (1.753 m), weight 250 lb (113.4 kg), SpO2 96 %, not currently breastfeeding. DISPOSITION  Bruna Pérez was discharged to home in stable condition.         Lendell Face, DO  01/04/22 1343

## 2022-06-13 ENCOUNTER — HOSPITAL ENCOUNTER (OUTPATIENT)
Age: 57
Setting detail: OBSERVATION
Discharge: HOME | End: 2022-06-15
Attending: EMERGENCY MEDICINE | Admitting: INTERNAL MEDICINE
Payer: COMMERCIAL

## 2022-06-13 ENCOUNTER — APPOINTMENT (OUTPATIENT)
Dept: GENERAL RADIOLOGY | Age: 57
End: 2022-06-13
Payer: COMMERCIAL

## 2022-06-13 ENCOUNTER — APPOINTMENT (OUTPATIENT)
Dept: CT IMAGING | Age: 57
End: 2022-06-13
Payer: COMMERCIAL

## 2022-06-13 DIAGNOSIS — R55 NEAR SYNCOPE: ICD-10-CM

## 2022-06-13 DIAGNOSIS — R07.9 CHEST PAIN, UNSPECIFIED TYPE: Primary | ICD-10-CM

## 2022-06-13 DIAGNOSIS — R42 DIZZINESS: ICD-10-CM

## 2022-06-13 LAB
A/G RATIO: 1.5 (ref 1.1–2.2)
ALBUMIN SERPL-MCNC: 4.9 G/DL (ref 3.4–5)
ALP BLD-CCNC: 124 U/L (ref 40–129)
ALT SERPL-CCNC: 28 U/L (ref 10–40)
ANION GAP SERPL CALCULATED.3IONS-SCNC: 11 MMOL/L (ref 3–16)
AST SERPL-CCNC: 28 U/L (ref 15–37)
BASOPHILS ABSOLUTE: 0.1 K/UL (ref 0–0.2)
BASOPHILS RELATIVE PERCENT: 0.8 %
BILIRUB SERPL-MCNC: 0.5 MG/DL (ref 0–1)
BILIRUBIN URINE: NEGATIVE
BLOOD, URINE: NEGATIVE
BUN BLDV-MCNC: 12 MG/DL (ref 7–20)
CALCIUM SERPL-MCNC: 9.5 MG/DL (ref 8.3–10.6)
CHLORIDE BLD-SCNC: 100 MMOL/L (ref 99–110)
CLARITY: CLEAR
CO2: 26 MMOL/L (ref 21–32)
COLOR: NORMAL
CREAT SERPL-MCNC: 0.6 MG/DL (ref 0.6–1.1)
EKG ATRIAL RATE: 72 BPM
EKG DIAGNOSIS: NORMAL
EKG P AXIS: 64 DEGREES
EKG P-R INTERVAL: 152 MS
EKG Q-T INTERVAL: 402 MS
EKG QRS DURATION: 88 MS
EKG QTC CALCULATION (BAZETT): 440 MS
EKG R AXIS: 47 DEGREES
EKG T AXIS: 41 DEGREES
EKG VENTRICULAR RATE: 72 BPM
EOSINOPHILS ABSOLUTE: 0.3 K/UL (ref 0–0.6)
EOSINOPHILS RELATIVE PERCENT: 3.6 %
GFR AFRICAN AMERICAN: >60
GFR NON-AFRICAN AMERICAN: >60
GLUCOSE BLD-MCNC: 93 MG/DL (ref 70–99)
GLUCOSE URINE: NEGATIVE MG/DL
HCG QUALITATIVE: NEGATIVE
HCT VFR BLD CALC: 36.7 % (ref 36–48)
HEMOGLOBIN: 12.3 G/DL (ref 12–16)
KETONES, URINE: NEGATIVE MG/DL
LEUKOCYTE ESTERASE, URINE: NEGATIVE
LYMPHOCYTES ABSOLUTE: 2 K/UL (ref 1–5.1)
LYMPHOCYTES RELATIVE PERCENT: 24.3 %
MAGNESIUM: 1.9 MG/DL (ref 1.8–2.4)
MAGNESIUM: 2 MG/DL (ref 1.8–2.4)
MCH RBC QN AUTO: 29.6 PG (ref 26–34)
MCHC RBC AUTO-ENTMCNC: 33.5 G/DL (ref 31–36)
MCV RBC AUTO: 88.6 FL (ref 80–100)
MICROSCOPIC EXAMINATION: NORMAL
MONOCYTES ABSOLUTE: 0.4 K/UL (ref 0–1.3)
MONOCYTES RELATIVE PERCENT: 4.7 %
NEUTROPHILS ABSOLUTE: 5.6 K/UL (ref 1.7–7.7)
NEUTROPHILS RELATIVE PERCENT: 66.6 %
NITRITE, URINE: NEGATIVE
PDW BLD-RTO: 13.2 % (ref 12.4–15.4)
PH UA: 6 (ref 5–8)
PLATELET # BLD: 343 K/UL (ref 135–450)
PMV BLD AUTO: 8.3 FL (ref 5–10.5)
POTASSIUM REFLEX MAGNESIUM: 3.9 MMOL/L (ref 3.5–5.1)
PRO-BNP: 34 PG/ML (ref 0–124)
PROTEIN UA: NEGATIVE MG/DL
RBC # BLD: 4.15 M/UL (ref 4–5.2)
SODIUM BLD-SCNC: 137 MMOL/L (ref 136–145)
SPECIFIC GRAVITY UA: 1.01 (ref 1–1.03)
TOTAL PROTEIN: 8.1 G/DL (ref 6.4–8.2)
TROPONIN: <0.01 NG/ML
URINE REFLEX TO CULTURE: NORMAL
URINE TYPE: NORMAL
UROBILINOGEN, URINE: 0.2 E.U./DL
WBC # BLD: 8.4 K/UL (ref 4–11)

## 2022-06-13 PROCEDURE — G0378 HOSPITAL OBSERVATION PER HR: HCPCS

## 2022-06-13 PROCEDURE — 96361 HYDRATE IV INFUSION ADD-ON: CPT

## 2022-06-13 PROCEDURE — 71045 X-RAY EXAM CHEST 1 VIEW: CPT

## 2022-06-13 PROCEDURE — 2580000003 HC RX 258: Performed by: NURSE PRACTITIONER

## 2022-06-13 PROCEDURE — 93005 ELECTROCARDIOGRAM TRACING: CPT | Performed by: EMERGENCY MEDICINE

## 2022-06-13 PROCEDURE — 84439 ASSAY OF FREE THYROXINE: CPT

## 2022-06-13 PROCEDURE — 84484 ASSAY OF TROPONIN QUANT: CPT

## 2022-06-13 PROCEDURE — 81003 URINALYSIS AUTO W/O SCOPE: CPT

## 2022-06-13 PROCEDURE — 6360000002 HC RX W HCPCS: Performed by: EMERGENCY MEDICINE

## 2022-06-13 PROCEDURE — 6370000000 HC RX 637 (ALT 250 FOR IP): Performed by: NURSE PRACTITIONER

## 2022-06-13 PROCEDURE — 96374 THER/PROPH/DIAG INJ IV PUSH: CPT

## 2022-06-13 PROCEDURE — 70450 CT HEAD/BRAIN W/O DYE: CPT

## 2022-06-13 PROCEDURE — 85025 COMPLETE CBC W/AUTO DIFF WBC: CPT

## 2022-06-13 PROCEDURE — 2580000003 HC RX 258: Performed by: EMERGENCY MEDICINE

## 2022-06-13 PROCEDURE — 93010 ELECTROCARDIOGRAM REPORT: CPT | Performed by: INTERNAL MEDICINE

## 2022-06-13 PROCEDURE — 70496 CT ANGIOGRAPHY HEAD: CPT

## 2022-06-13 PROCEDURE — 99285 EMERGENCY DEPT VISIT HI MDM: CPT

## 2022-06-13 PROCEDURE — 83735 ASSAY OF MAGNESIUM: CPT

## 2022-06-13 PROCEDURE — 96375 TX/PRO/DX INJ NEW DRUG ADDON: CPT

## 2022-06-13 PROCEDURE — 84443 ASSAY THYROID STIM HORMONE: CPT

## 2022-06-13 PROCEDURE — 83880 ASSAY OF NATRIURETIC PEPTIDE: CPT

## 2022-06-13 PROCEDURE — 6360000004 HC RX CONTRAST MEDICATION: Performed by: EMERGENCY MEDICINE

## 2022-06-13 PROCEDURE — 6370000000 HC RX 637 (ALT 250 FOR IP): Performed by: EMERGENCY MEDICINE

## 2022-06-13 PROCEDURE — 84703 CHORIONIC GONADOTROPIN ASSAY: CPT

## 2022-06-13 PROCEDURE — 80053 COMPREHEN METABOLIC PANEL: CPT

## 2022-06-13 RX ORDER — ASPIRIN 81 MG/1
324 TABLET, CHEWABLE ORAL ONCE
Status: COMPLETED | OUTPATIENT
Start: 2022-06-13 | End: 2022-06-13

## 2022-06-13 RX ORDER — PROMETHAZINE HYDROCHLORIDE 25 MG/1
12.5 TABLET ORAL EVERY 6 HOURS PRN
Status: DISCONTINUED | OUTPATIENT
Start: 2022-06-13 | End: 2022-06-15 | Stop reason: HOSPADM

## 2022-06-13 RX ORDER — FAMOTIDINE 20 MG/1
20 TABLET, FILM COATED ORAL ONCE
Status: COMPLETED | OUTPATIENT
Start: 2022-06-13 | End: 2022-06-13

## 2022-06-13 RX ORDER — POLYETHYLENE GLYCOL 3350 17 G/17G
17 POWDER, FOR SOLUTION ORAL DAILY PRN
Status: DISCONTINUED | OUTPATIENT
Start: 2022-06-13 | End: 2022-06-15 | Stop reason: HOSPADM

## 2022-06-13 RX ORDER — SODIUM CHLORIDE 0.9 % (FLUSH) 0.9 %
5-40 SYRINGE (ML) INJECTION PRN
Status: DISCONTINUED | OUTPATIENT
Start: 2022-06-13 | End: 2022-06-15 | Stop reason: HOSPADM

## 2022-06-13 RX ORDER — SODIUM CHLORIDE 0.9 % (FLUSH) 0.9 %
5-40 SYRINGE (ML) INJECTION EVERY 12 HOURS SCHEDULED
Status: DISCONTINUED | OUTPATIENT
Start: 2022-06-13 | End: 2022-06-15 | Stop reason: HOSPADM

## 2022-06-13 RX ORDER — METOPROLOL SUCCINATE 25 MG/1
25 TABLET, EXTENDED RELEASE ORAL DAILY
Status: DISCONTINUED | OUTPATIENT
Start: 2022-06-14 | End: 2022-06-15 | Stop reason: HOSPADM

## 2022-06-13 RX ORDER — ACETAMINOPHEN 325 MG/1
650 TABLET ORAL EVERY 6 HOURS PRN
Status: DISCONTINUED | OUTPATIENT
Start: 2022-06-13 | End: 2022-06-15 | Stop reason: HOSPADM

## 2022-06-13 RX ORDER — SODIUM CHLORIDE 9 MG/ML
INJECTION, SOLUTION INTRAVENOUS PRN
Status: DISCONTINUED | OUTPATIENT
Start: 2022-06-13 | End: 2022-06-15 | Stop reason: HOSPADM

## 2022-06-13 RX ORDER — ENOXAPARIN SODIUM 100 MG/ML
40 INJECTION SUBCUTANEOUS DAILY
Status: DISCONTINUED | OUTPATIENT
Start: 2022-06-14 | End: 2022-06-15 | Stop reason: HOSPADM

## 2022-06-13 RX ORDER — 0.9 % SODIUM CHLORIDE 0.9 %
500 INTRAVENOUS SOLUTION INTRAVENOUS ONCE
Status: COMPLETED | OUTPATIENT
Start: 2022-06-13 | End: 2022-06-13

## 2022-06-13 RX ORDER — DEXAMETHASONE SODIUM PHOSPHATE 10 MG/ML
8 INJECTION INTRAMUSCULAR; INTRAVENOUS ONCE
Status: COMPLETED | OUTPATIENT
Start: 2022-06-13 | End: 2022-06-13

## 2022-06-13 RX ORDER — ONDANSETRON 2 MG/ML
4 INJECTION INTRAMUSCULAR; INTRAVENOUS ONCE
Status: COMPLETED | OUTPATIENT
Start: 2022-06-13 | End: 2022-06-13

## 2022-06-13 RX ORDER — METOCLOPRAMIDE HYDROCHLORIDE 5 MG/ML
10 INJECTION INTRAMUSCULAR; INTRAVENOUS ONCE
Status: DISCONTINUED | OUTPATIENT
Start: 2022-06-13 | End: 2022-06-13

## 2022-06-13 RX ORDER — ASPIRIN 81 MG/1
81 TABLET, CHEWABLE ORAL DAILY
Status: DISCONTINUED | OUTPATIENT
Start: 2022-06-14 | End: 2022-06-15 | Stop reason: HOSPADM

## 2022-06-13 RX ORDER — MECLIZINE HCL 12.5 MG/1
25 TABLET ORAL ONCE
Status: COMPLETED | OUTPATIENT
Start: 2022-06-13 | End: 2022-06-13

## 2022-06-13 RX ORDER — DIPHENHYDRAMINE HYDROCHLORIDE 50 MG/ML
12.5 INJECTION INTRAMUSCULAR; INTRAVENOUS ONCE
Status: DISCONTINUED | OUTPATIENT
Start: 2022-06-13 | End: 2022-06-13

## 2022-06-13 RX ORDER — ATORVASTATIN CALCIUM 40 MG/1
40 TABLET, FILM COATED ORAL NIGHTLY
Status: DISCONTINUED | OUTPATIENT
Start: 2022-06-13 | End: 2022-06-15 | Stop reason: HOSPADM

## 2022-06-13 RX ORDER — ACETAMINOPHEN 650 MG/1
650 SUPPOSITORY RECTAL EVERY 6 HOURS PRN
Status: DISCONTINUED | OUTPATIENT
Start: 2022-06-13 | End: 2022-06-15 | Stop reason: HOSPADM

## 2022-06-13 RX ADMIN — METOPROLOL TARTRATE 25 MG: 25 TABLET, FILM COATED ORAL at 21:46

## 2022-06-13 RX ADMIN — DEXAMETHASONE SODIUM PHOSPHATE 8 MG: 10 INJECTION INTRAMUSCULAR; INTRAVENOUS at 16:44

## 2022-06-13 RX ADMIN — SODIUM CHLORIDE 500 ML: 9 INJECTION, SOLUTION INTRAVENOUS at 15:31

## 2022-06-13 RX ADMIN — ONDANSETRON 4 MG: 2 INJECTION INTRAMUSCULAR; INTRAVENOUS at 15:32

## 2022-06-13 RX ADMIN — SODIUM CHLORIDE, PRESERVATIVE FREE 10 ML: 5 INJECTION INTRAVENOUS at 22:55

## 2022-06-13 RX ADMIN — MECLIZINE 25 MG: 12.5 TABLET ORAL at 15:32

## 2022-06-13 RX ADMIN — ASPIRIN 324 MG: 81 TABLET, CHEWABLE ORAL at 16:47

## 2022-06-13 RX ADMIN — IOPAMIDOL 75 ML: 755 INJECTION, SOLUTION INTRAVENOUS at 16:08

## 2022-06-13 RX ADMIN — FAMOTIDINE 20 MG: 20 TABLET, FILM COATED ORAL at 16:47

## 2022-06-13 ASSESSMENT — ENCOUNTER SYMPTOMS
DIARRHEA: 1
VOMITING: 0
ABDOMINAL PAIN: 0
TROUBLE SWALLOWING: 0
COUGH: 0
RHINORRHEA: 0
SINUS PRESSURE: 0
SHORTNESS OF BREATH: 1
NAUSEA: 1

## 2022-06-13 ASSESSMENT — HEART SCORE: ECG: 1

## 2022-06-13 ASSESSMENT — PAIN - FUNCTIONAL ASSESSMENT: PAIN_FUNCTIONAL_ASSESSMENT: NONE - DENIES PAIN

## 2022-06-13 NOTE — ED PROVIDER NOTES
[facility]  ED  EMERGENCY DEPARTMENT ENCOUNTER      Pt Name: Gurmeet Strange  MRN: [id number]  [place] 1965  Date of evaluation: 6/13/2022  Provider: Grecia Valladares, [address] [place] [address]       Chief Complaint   Patient presents with    Chest Pain     burning in right chest and neck associated with sx    Dizziness     pt feels like passing out x 4 days         HISTORY OF PRESENT ILLNESS   (Location/Symptom, Timing/Onset, Context/Setting, Quality, Duration, Modifying Factors, Severity)  Note limiting factors. Gurmeet Strange is a 65 y.o. female who presents to the emergency department due to dizziness    Patient has been experiencing dizziness that she describes as the world flipping over for the last 4 days. Yesterday her symptoms were at their worst. She describes a sensation of being off balance but no specific pattern or laterality. She also describes tunnel vision and the feeling that things are closing in on her. She has also felt like she is going to pass out but no syncopal events. Her dizziness and balance issues are worse with movement. Her symptoms are constant but the severity waxes and wanes. Positive nausea but no vomiting. She also states that her reflux symptoms have been worse recently. She describes burning chest pain. She has not taken any medications for reflux. No pain radiating to either arm. Positive SOB. She feels that she cannot take a deep breath. No cough. She has also had increased neck tension. She has a headache that she rates as a 2/10. She feels that her neck tension is causing her HA. Her dizziness has not been consistently associated with HA and the severity of her headache does not correlate with the severity of her other symptoms. Positive hot flashes, positive weakness/ sensation of being shaky. Positive diarrhea for the last 3 days. Patient has a hx of left thigh mass which was resected in 1986. She believes that it was a soft tissue mass.  It has returned but she has not had follow up for this. She does not currently have a PCP. Nursing Notes were reviewed. REVIEW OF SYSTEMS    (2-9 systems for level 4, 10 or more for level 5)     Review of Systems   Constitutional: Negative for chills, fatigue and fever. HENT: Negative for rhinorrhea, sinus pressure and trouble swallowing. Respiratory: Positive for shortness of breath. Negative for cough. Cardiovascular: Positive for chest pain. Negative for palpitations. Gastrointestinal: Positive for diarrhea and nausea. Negative for abdominal pain and vomiting. Endocrine: Positive for heat intolerance. Negative for cold intolerance. Genitourinary: Negative for flank pain. Musculoskeletal: Positive for neck pain. Negative for arthralgias, gait problem, myalgias and neck stiffness. Neurological: Positive for dizziness, weakness, light-headedness and headaches. Negative for facial asymmetry and numbness. Psychiatric/Behavioral: Negative for agitation, behavioral problems and confusion. Except as noted above the remainder of the review of systems was reviewed and negative.        PAST MEDICAL HISTORY     Past Medical History:   Diagnosis Date    Bursitis     colonoscopy 03/06/2018    Dyspnea     GERD (gastroesophageal reflux disease)     Hypertension          SURGICAL HISTORY       Past Surgical History:   Procedure Laterality Date    CHOLECYSTECTOMY      COLONOSCOPY  03/06/2018         CURRENT MEDICATIONS       Previous Medications    IBUPROFEN (IBU) 600 MG TABLET    Take 1 tablet by mouth every 6 hours as needed for Pain    LOSARTAN (COZAAR) 50 MG TABLET    Take 1 tablet by mouth daily       ALLERGIES     Codeine and Adhesive tape    FAMILY HISTORY       Family History   Problem Relation Age of Onset    Diabetes Father     Cancer Father     Colon Cancer Father     Mental Retardation Sister     Diabetes Sister     High Blood Pressure Brother     Diabetes Sister SOCIAL HISTORY       Social History     Socioeconomic History    Marital status:      Spouse name: None    Number of children: None    Years of education: None    Highest education level: None   Occupational History    None   Tobacco Use    Smoking status: Never Smoker    Smokeless tobacco: Never Used   Vaping Use    Vaping Use: Never used   Substance and Sexual Activity    Alcohol use: No    Drug use: No    Sexual activity: None   Other Topics Concern    None   Social History Narrative    None     Social Determinants of Health     Financial Resource Strain:     Difficulty of Paying Living Expenses: Not on file   Food Insecurity:     Worried About Running Out of Food in the Last Year: Not on file    Juany of Food in the Last Year: Not on file   Transportation Needs:     Lack of Transportation (Medical): Not on file    Lack of Transportation (Non-Medical):  Not on file   Physical Activity:     Days of Exercise per Week: Not on file    Minutes of Exercise per Session: Not on file   Stress:     Feeling of Stress : Not on file   Social Connections:     Frequency of Communication with Friends and Family: Not on file    Frequency of Social Gatherings with Friends and Family: Not on file    Attends Buddhist Services: Not on file    Active Member of [address] [place] [address] or Organizations: Not on file    Attends Club or Organization Meetings: Not on file    Marital Status: Not on file   Intimate Partner Violence:     Fear of Current or Ex-Partner: Not on file    Emotionally Abused: Not on file    Physically Abused: Not on file    Sexually Abused: Not on file   Housing Stability:     Unable to Pay for Housing in the Last Year: Not on file    Number of [place] in the Last Year: Not on file    Unstable Housing in the Last Year: Not on file       SCREENINGS        [place] Coma Scale  Eye Opening: Spontaneous  Best Verbal Response: Oriented  Best Motor Response: Obeys commands  [place] Coma Scale Score: 15 Heart Score for chest pain patients  History: Slightly Suspicious  ECG: Non-Specifc repolarization disturbance/LBTB/PM  Patient Age: > 39 and < 65 years  *Risk factors for Atherosclerotic disease: Hypertension,Obesity,Positive family History  Risk Factors: > 3 Risk factors or history of atherosclerotic disease*  Troponin: < 1X normal limit  Heart Score Total: 4             PHYSICAL EXAM    (up to 7 for level 4, 8 or more for level 5)     ED Triage Vitals [06/13/22 1405]   BP Temp Temp Source Heart Rate Resp SpO2 Height Weight   (!) 177/93 98.5 °F (36.9 °C) Oral 78 18 99 % 5' 9\" (1.753 m) 245 lb (111.1 kg)       Physical Exam  Constitutional:       Appearance: Normal appearance. HENT:      Head: Normocephalic and atraumatic. Mouth/Throat:      Mouth: Mucous membranes are moist.      Pharynx: Oropharynx is clear. Eyes:      Extraocular Movements: Extraocular movements intact. Conjunctiva/sclera: Conjunctivae normal.      Pupils: Pupils are equal, round, and reactive to light. Cardiovascular:      Rate and Rhythm: Tachycardia present. Pulses: Normal pulses. Pulmonary:      Effort: Pulmonary effort is normal.      Breath sounds: Normal breath sounds. Abdominal:      General: There is no distension. Palpations: Abdomen is soft. Tenderness: There is no abdominal tenderness. There is no guarding or rebound. Skin:     General: Skin is warm and dry. Capillary Refill: Capillary refill takes less than 2 seconds. Neurological:      General: No focal deficit present. Mental Status: She is alert and oriented to person, place, and time. Cranial Nerves: No cranial nerve deficit. Sensory: No sensory deficit. Motor: No weakness. Coordination: Coordination normal.      Comments: Mildly positive Romberg.     Psychiatric:         Mood and Affect: Mood normal.         Behavior: Behavior normal.         DIAGNOSTIC RESULTS     EKG: All EKG's are interpreted by the Emergency Department Physician who either signs or Co-signs this chart in the absence of a cardiologist.    EKG  The EKG interpreted by myself in the absence of a cardiologist.  normal sinus rhythm with a rate of 72  Axis is   Normal  QTc is  shortened with repolarisation abnormality  Non-specific  ST-T wave changes appreciated. No evidence of acute ischemia. Significant change from prior EKG dated 6/24/2021    RADIOLOGY:   Non-plain film images such as CT, Ultrasound and MRI are read by the radiologist. Plain radiographic images are visualized and preliminarily interpreted by the emergency physician with the below findings:    Interpretation per the Radiologist below, if available at the time of this note:    XR CHEST PORTABLE   Final Result   Clear lungs. CT HEAD WO CONTRAST    (Results Pending)   CTA HEAD NECK W CONTRAST    (Results Pending)         ED BEDSIDE ULTRASOUND:   Performed by ED Physician - none    LABS:  Labs Reviewed   CBC WITH AUTO DIFFERENTIAL   COMPREHENSIVE METABOLIC PANEL W/ REFLEX TO MG FOR LOW K   TROPONIN   BRAIN NATRIURETIC PEPTIDE   URINALYSIS WITH REFLEX TO CULTURE   HCG, SERUM, QUALITATIVE   MAGNESIUM       All other labs were within normal range or not returned as of this dictation. EMERGENCY DEPARTMENT COURSE and DIFFERENTIAL DIAGNOSIS/MDM:   Vitals:    Vitals:    06/13/22 1405 06/13/22 1447   BP: (!) 177/93 (!) 174/94   Pulse: 78 81   Resp: 18 22   Temp: 98.5 °F (36.9 °C)    TempSrc: Oral    SpO2: 99% 100%   Weight: 245 lb (111.1 kg)    Height: 5' 9\" (1.753 m)          MDM  Number of Diagnoses or Management Options  Diagnosis management comments: Patient presented to the ED primarily for dizziness but also complained of chest pain that she attributed to reflux. Her BP was elevated 174/94 or arrival to ED. Heart Score: 4. Abnormal EKG. CT/ CTA pending. Signing out to Dr. Roshni Romano to follow up on imaging.          REASSESSMENT          CRITICAL CARE TIME   Total Critical Care time was 0minutes, excluding separately reportable procedures. There was a high probability of clinically significant/life threatening deterioration in the patient's condition which required my urgent intervention. CONSULTS:  None    PROCEDURES:  Unless otherwise noted below, none         FINAL IMPRESSION    No diagnosis found. DISPOSITION/PLAN   DISPOSITION        PATIENT REFERRED TO:  No follow-up provider specified. DISCHARGE MEDICATIONS:  New Prescriptions    No medications on file     Controlled Substances Monitoring:     No flowsheet data found.     (Please note that portions of this note were completed with a voice recognition program.  Efforts were made to edit the dictations but occasionally words are mis-transcribed.)    Ana Hayes DO (electronically signed)  PGY-1         Tanya Hayes DO  Resident  06/13/22 7889

## 2022-06-13 NOTE — ED PROVIDER NOTES
I independently performed a history and physical on Yakelin Pérez. All diagnostic, treatment, and disposition decisions were made by myself in conjunction with the resident physician. I personally saw the patient and performed a substantive portion of the visit including all aspects of the medical decision making. For further details of [facility] emergency department encounter, please see Isamar Hayes DO's documentation. Patient is a 16-year-old female with intermittent lightheadedness and dizziness over the last 4 days that does seem to wax and wane in intensity and also does seem to worsen with certain movements but also having intermittent chest pain associated with the sensation of a pulled muscle that goes into the right side of her neck with increased shortness of breath with exertion over the last week. She does feel anxious related to trying to find a house to buy but states she there is no increased anxiety over the past few weeks that she can think of the cause of the symptoms. She denies any falls or trauma. She does report having a mild headache although not the worst of her life. She denies any unilateral numbness or weakness but does feel weak all over. She denies any visual changes currently although did have some blurry vision earlier today. She does report having intermittent tinnitus as well. She denies any hearing loss. She denies any syncope. She denies any abdominal pain or vomiting but does occasionally feel nauseated with the dizziness. She has had vertigo before but this feels different. She also has a history of high blood pressure and is not always compliant with her losartan. Her daughter had a history of an acoustic neuroma that was removed a few years ago. Due to concern for atypical chest pain along with lightheadedness and dizziness different than her typical vertigo, she came to the ED for further evaluation.           Physical:   Gen: No acute distress. AOx3. Pysch: Normal mood and affect  HEENT: NCAT, EOMI, PERRL, MMM, no facial droop, no nystagmus but does complain of increased dizziness when looking to the right, normal visual fields, normal sensation  Neck: supple, normal range of motion, no nuchal rigidity  Cardiac: RRR, pulses 2+ in upper extremities  Lungs: C2AB, no R/R/W   Abdomen: soft and nontender with no R/D/G  Neuro: no focal neuro deficits with strength and sensation 5/5 in all 4 extremities, normal ambulation, negative Romberg, NIHSS = 0, no pronator drift, normal finger-to-nose bilaterally, normal heel-to-shin bilaterally     The Ekg interpreted by me shows  normal sinus rhythm with a rate of 72  Axis is   Normal  QTc is  normal  Intervals and Durations are unremarkable. ST Segments: no acute change and nonspecific changes  No significant change from prior EKG dated - 6/24/21  No STEMI           MDM: Patient was evaluated due to having intermittent chest discomfort over the last few days with increased shortness of breath with exertion along with pain radiating to the neck but also complaining about lightheadedness and dizziness over the last few days where she feels off balance and like she could pass out. She does have poorly controlled hypertension which also could be a cause of her symptoms although it did improve somewhat in the ED. She states that she thinks she may have had a reaction to contrast in the past but denies any anaphylactic concerns or trouble breathing after the contrast but just thinks she had some type of response. I did give Decadron related to this. She received meclizine along with Zofran for the dizziness with nausea. She also received aspirin for the chest pain. Heart score equals 4 and she denies any recent cardiac work-up.   I did discuss with her that based on her symptoms, I might be reasonable to be admitted for further assessment and cardiac evaluation, especially with elevated heart score but I did tell her that I would have Dr. Daniel Ha follow-up on the imaging and make the final determination as to if she needs to be admitted or not. Please see his note for final disposition. She was in no acute distress at time of signout at 1600 and felt comfortable with this plan.           Ariana Mena MD  06/13/22 4617

## 2022-06-14 ENCOUNTER — APPOINTMENT (OUTPATIENT)
Dept: NUCLEAR MEDICINE | Age: 57
End: 2022-06-14
Payer: COMMERCIAL

## 2022-06-14 ENCOUNTER — APPOINTMENT (OUTPATIENT)
Dept: MRI IMAGING | Age: 57
End: 2022-06-14
Payer: COMMERCIAL

## 2022-06-14 LAB
ANION GAP SERPL CALCULATED.3IONS-SCNC: 14 MMOL/L (ref 3–16)
BUN BLDV-MCNC: 13 MG/DL (ref 7–20)
CALCIUM SERPL-MCNC: 9.9 MG/DL (ref 8.3–10.6)
CHLORIDE BLD-SCNC: 101 MMOL/L (ref 99–110)
CHOLESTEROL, TOTAL: 209 MG/DL (ref 0–199)
CO2: 21 MMOL/L (ref 21–32)
CREAT SERPL-MCNC: <0.5 MG/DL (ref 0.6–1.1)
EKG ATRIAL RATE: 64 BPM
EKG DIAGNOSIS: NORMAL
EKG P AXIS: 51 DEGREES
EKG P-R INTERVAL: 148 MS
EKG Q-T INTERVAL: 426 MS
EKG QRS DURATION: 98 MS
EKG QTC CALCULATION (BAZETT): 439 MS
EKG R AXIS: 12 DEGREES
EKG T AXIS: 12 DEGREES
EKG VENTRICULAR RATE: 64 BPM
ESTIMATED AVERAGE GLUCOSE: 122.6 MG/DL
GFR AFRICAN AMERICAN: >60
GFR NON-AFRICAN AMERICAN: >60
GLUCOSE BLD-MCNC: 134 MG/DL (ref 70–99)
HBA1C MFR BLD: 5.9 %
HCT VFR BLD CALC: 35.7 % (ref 36–48)
HDLC SERPL-MCNC: 60 MG/DL (ref 40–60)
HEMOGLOBIN: 11.7 G/DL (ref 12–16)
LDL CHOLESTEROL CALCULATED: 135 MG/DL
LV EF: 70 %
LVEF MODALITY: NORMAL
MCH RBC QN AUTO: 29.5 PG (ref 26–34)
MCHC RBC AUTO-ENTMCNC: 32.7 G/DL (ref 31–36)
MCV RBC AUTO: 90.5 FL (ref 80–100)
PDW BLD-RTO: 13.1 % (ref 12.4–15.4)
PLATELET # BLD: 323 K/UL (ref 135–450)
PMV BLD AUTO: 8.2 FL (ref 5–10.5)
POTASSIUM REFLEX MAGNESIUM: 4.4 MMOL/L (ref 3.5–5.1)
RBC # BLD: 3.95 M/UL (ref 4–5.2)
SODIUM BLD-SCNC: 136 MMOL/L (ref 136–145)
T4 FREE: 1.1 NG/DL (ref 0.9–1.8)
TRIGL SERPL-MCNC: 71 MG/DL (ref 0–150)
TROPONIN: <0.01 NG/ML
TSH SERPL DL<=0.05 MIU/L-ACNC: 0.96 UIU/ML (ref 0.27–4.2)
VLDLC SERPL CALC-MCNC: 14 MG/DL
WBC # BLD: 12.1 K/UL (ref 4–11)

## 2022-06-14 PROCEDURE — 80048 BASIC METABOLIC PNL TOTAL CA: CPT

## 2022-06-14 PROCEDURE — 84484 ASSAY OF TROPONIN QUANT: CPT

## 2022-06-14 PROCEDURE — 99222 1ST HOSP IP/OBS MODERATE 55: CPT | Performed by: PSYCHIATRY & NEUROLOGY

## 2022-06-14 PROCEDURE — 93017 CV STRESS TEST TRACING ONLY: CPT

## 2022-06-14 PROCEDURE — 83036 HEMOGLOBIN GLYCOSYLATED A1C: CPT

## 2022-06-14 PROCEDURE — 99244 OFF/OP CNSLTJ NEW/EST MOD 40: CPT | Performed by: INTERNAL MEDICINE

## 2022-06-14 PROCEDURE — A9502 TC99M TETROFOSMIN: HCPCS | Performed by: NURSE PRACTITIONER

## 2022-06-14 PROCEDURE — 78452 HT MUSCLE IMAGE SPECT MULT: CPT

## 2022-06-14 PROCEDURE — 2580000003 HC RX 258: Performed by: NURSE PRACTITIONER

## 2022-06-14 PROCEDURE — 6370000000 HC RX 637 (ALT 250 FOR IP): Performed by: NURSE PRACTITIONER

## 2022-06-14 PROCEDURE — 85027 COMPLETE CBC AUTOMATED: CPT

## 2022-06-14 PROCEDURE — 3430000000 HC RX DIAGNOSTIC RADIOPHARMACEUTICAL: Performed by: NURSE PRACTITIONER

## 2022-06-14 PROCEDURE — 80061 LIPID PANEL: CPT

## 2022-06-14 PROCEDURE — G0378 HOSPITAL OBSERVATION PER HR: HCPCS

## 2022-06-14 PROCEDURE — 93005 ELECTROCARDIOGRAM TRACING: CPT | Performed by: NURSE PRACTITIONER

## 2022-06-14 PROCEDURE — 70551 MRI BRAIN STEM W/O DYE: CPT

## 2022-06-14 PROCEDURE — 93010 ELECTROCARDIOGRAM REPORT: CPT | Performed by: INTERNAL MEDICINE

## 2022-06-14 PROCEDURE — 36415 COLL VENOUS BLD VENIPUNCTURE: CPT

## 2022-06-14 RX ADMIN — SODIUM CHLORIDE, PRESERVATIVE FREE 10 ML: 5 INJECTION INTRAVENOUS at 07:51

## 2022-06-14 RX ADMIN — ASPIRIN 81 MG: 81 TABLET, CHEWABLE ORAL at 07:52

## 2022-06-14 RX ADMIN — TETROFOSMIN 10.9 MILLICURIE: 1.38 INJECTION, POWDER, LYOPHILIZED, FOR SOLUTION INTRAVENOUS at 10:59

## 2022-06-14 RX ADMIN — SODIUM CHLORIDE, PRESERVATIVE FREE 10 ML: 5 INJECTION INTRAVENOUS at 20:14

## 2022-06-14 RX ADMIN — TETROFOSMIN 32 MILLICURIE: 1.38 INJECTION, POWDER, LYOPHILIZED, FOR SOLUTION INTRAVENOUS at 12:25

## 2022-06-14 ASSESSMENT — PAIN SCALES - GENERAL
PAINLEVEL_OUTOF10: 0
PAINLEVEL_OUTOF10: 0

## 2022-06-14 NOTE — PROGRESS NOTES
Hospitalist Progress Note      PCP: Stacey GALVIN DO    Date of Admission: 6/13/2022    Chief Complaint: Chest pain and dizziness    Hospital Course:   Patient is a 52-year-old female with a past medical history of hypertension, vertigo and IBS who presented to [facility] with a complaint of 4 days worth of headache, dizziness and chest pain. She states her most significant symptoms were on Sunday while she was riding in a car. She states she has a history of vertigo however this dizziness feels different. The dizziness comes and goes randomly and lasts only minutes. She seems to think that it is exacerbated by quick head movements. Her chest pain is on the right side of her chest.  She describes it as sharp and stabbing with a max of 5 out of 10. She does endorse bilateral neck pain. No associated arm pain. She does endorse mild shortness of breath however she has this at baseline. She does have some nausea. Work-up in the ED showed a chest x-ray negative for acute process. CT head and CT head and neck were negative for acute findings. EKG showed normal sinus rhythm. She has had a troponin negative x4. Subjective:   Patient was seen and examined this morning. She is resting comfortably in bed. She denies any current chest pain or dizziness. She denies any nausea, vomiting, or diarrhea.     Medications:  Reviewed    Infusion Medications    sodium chloride       Scheduled Medications    sodium chloride flush  5-40 mL IntraVENous 2 times per day    aspirin  81 mg Oral Daily    atorvastatin  40 mg Oral Nightly    enoxaparin  40 mg SubCUTAneous Daily    metoprolol succinate  25 mg Oral Daily     PRN Meds: sodium chloride flush, sodium chloride, acetaminophen **OR** acetaminophen, polyethylene glycol, perflutren lipid microspheres, promethazine      Intake/Output Summary (Last 24 hours) at 6/14/2022 0999  Last data filed at 6/14/2022 0747  Gross per 24 hour   Intake 0 ml   Output --   Net 0 ml       Physical Exam Performed:  /71   Pulse 76   Temp 97.5 °F (36.4 °C) (Oral)   Resp 16   Ht 5' 9\" (1.753 m)   Wt 250 lb (113.4 kg)   SpO2 96%   BMI 36.92 kg/m²     General appearance: No apparent distress, appears stated age and cooperative. HEENT: Conjunctivae/corneas clear. Neck: Supple, with full range of motion. No jugular venous distention. Respiratory:  Normal respiratory effort. Clear to auscultation, bilaterally without Rales/Wheezes/Rhonchi. Cardiovascular: Regular rate and rhythm without murmurs, rubs or gallops. Abdomen: Soft, non-tender, non-distended with normal bowel sounds. Musculoskeletal: No clubbing, cyanosis or edema bilaterally. Full range of motion without deformity. Skin: Skin color, texture, turgor normal.  No rashes or lesions. Neurologic:  Neurovascularly intact without any focal sensory/motor deficits. Psychiatric: Alert and oriented, thought content appropriate, normal insight      Labs:   Recent Labs     06/13/22  1446 06/14/22  0712   WBC 8.4 12.1*   HGB 12.3 11.7*   HCT 36.7 35.7*    323     Recent Labs     06/13/22  1446 06/14/22  0712    136   K 3.9 4.4    101   CO2 26 21   BUN 12 13   CREATININE 0.6 <0.5*   CALCIUM 9.5 9.9     Recent Labs     06/13/22  1446   AST 28   ALT 28   BILITOT 0.5   ALKPHOS 124     No results for input(s): INR in the last 72 hours. Recent Labs     06/13/22  1800 06/13/22  2145 06/14/22  0116   TROPONINI <0.01 <0.01 <0.01       Urinalysis:    Lab Results   Component Value Date    NITRU Negative 06/13/2022    BLOODU Negative 06/13/2022    SPECGRAV 1.015 06/13/2022    GLUCOSEU Negative 06/13/2022       Radiology:  CTA HEAD NECK W CONTRAST   Final Result   No acute abnormality or flow-limiting stenosis of the major arteries of the   head and neck. CT HEAD WO CONTRAST   Final Result   No acute intracranial abnormality. XR CHEST PORTABLE   Final Result   Clear lungs.          NM Cardiac Stress Test Nuclear Imaging    (Results Pending)   MRI BRAIN WO CONTRAST    (Results Pending)       Assessment/Plan:    Active Hospital Problems    Diagnosis     Chest pain [R07.9]      Priority: Medium    Dizziness [R42]      Priority: Medium    Obesity (BMI 30-39. 9) [E66.9]     Uncontrolled hypertension [I10]      1. Chest pain, rule out ACS, POA  - EKG completed in the ED was negative for acute ST or T wave changes  - Troponin negative x4  - Aspirin given in the ED. Will continue 81 mg of aspirin daily  - Lipid panel pending. Started atorvastatin 40 mg  - Echo and stress testing pending  - Cardiology consulted and appreciate their recommendations    2. Dizziness, history of vertigo, POA  - Intermittent episodes for the last 4 days, provoked with quick head movements  - Likely BPPV  - Patient received meclizine, Pepcid and Decadron in the ED  - Neurology consulted and appreciate their recommendations   - Recommended MRI to rule out posterior CVA   - Recommended echo    3. Hypertension  - Uncontrolled  - Patient has been lost to follow-up since 2020  - Was previously on Cozaar 25 mg daily  - Patient was started on metoprolol 25 mg  - We will continue to monitor    4. Obesity  - BMI 36.92  - Hemoglobin A1c and TSH pending    DVT Prophylaxis: Lovenox  Diet: Diet NPO  Code Status: Full Code    PT/OT Eval Status: Not required    Dispo -1 to 2 days pending clinical improvement    Jefferson Esquivel DO     Addendum to Resident  Progress note:  Pt seen,examined and evaluated with resident and discussed regarding POC . I have reviewed the current history, physical findings, labs and assessment and plan and agree with note as documented by resident DO ( )    Patient off the floor for her stress test this morning.   Noted neurology recommendations to obtain brain MRI and echocardiogram.  Will follow ; continue current care as above    Earlene Santos MD  Hospitalist Physician

## 2022-06-14 NOTE — H&P
Hospital Medicine History & Physical      PCP: [facility],     Date of Admission: 6/13/2022    Date of Service: Pt seen/examined on 6/13/2022 and placed in Observation. Time of Service: 2110    Chief Complaint:    Chief Complaint   Patient presents with    Chest Pain     burning in right chest and neck associated with sx    Dizziness     pt feels like passing out x 4 days     History Of Present Illness:      Headache, dizziness unlike previous vertigo symptoms  Dizziness came first, on Friday, nausea, no vomiting, diarrhea but history of IBS, no fever, tunneled vision without visual field deficits, felt lightheadedness without nearing syncope, possible chest pain? High level of stress recently    HA right frontal, dull ache,     Ms Jaret Jolly is a 60-year-old female with significant past medical history for hypertension, vertigo, and IBS who presents to [facility] emergency department with complaint of 4 days worth of headache, dizziness, and chest pain. She states also 3 symptoms began around the same time this past Friday. She is most concerned about the dizziness, she states she has had a history of vertigo however, this sensation of dizziness is different because there is no sensation in spinning, just simply off balance and feels like riding on a ship in the waves. The dizziness comes and goes randomly, lasting only minutes per episode, seems to be exacerbated with quick movements of her head.  at bedsides denies any moments where there was any facial numbness or drooping, slurred speech, aphasia, or no unilateral extremity weakness or dexterity changes. As mentioned above, she has associated mild chest pain that is left-sided, nonradiating, max 4 out of 10, described as a burning. She also has an ongoing headache that seems to be lasting since Friday, located to the right frontal part of her head, nonradiating, described as a dull ache.   She denies any other associated symptoms such as fever, palpitations, vomiting. She does note some mild nausea with the headache and occasional diarrhea however she notes she has IBS. On arrival her blood pressure was noted to be elevated, she has been without PCP coverage for approximately 1 year though she is still adherent to her blood pressure medication which is Cozaar 25 mg once daily. Her evaluation here included laboratory studies, EKG, chest x-ray, and CT scans of the head. CT head and CT head neck with contrast were both negative for any acute findings; no LVO. Chest x-ray was negative for acute cardiopulmonary processes. EKG showed normal sinus rhythm with evidence of left atrial enlargement otherwise no ST segment or T wave changes. All laboratory values from the ED visit were normal; magnesium 1.9, BNP 34, and initial and repeat troponin less than 0.01. Heart score was noted to be 4, so hospital team was consulted place patient observation for dizziness and chest pain rule out ACS. Past Medical History:          Diagnosis Date    Bursitis     colonoscopy 03/06/2018    Dyspnea     GERD (gastroesophageal reflux disease)     Hypertension      Past Surgical History:          Procedure Laterality Date    CHOLECYSTECTOMY      COLONOSCOPY  03/06/2018       Medications Prior to Admission:      Prior to Admission medications    Medication Sig Start Date End Date Taking? Authorizing Provider   losartan (COZAAR) 50 MG tablet Take 1 tablet by mouth daily 1/3/22   Gracie Ya DO   ibuprofen (IBU) 600 MG tablet Take 1 tablet by mouth every 6 hours as needed for Pain  Patient not taking: Reported on 6/13/2022 12/31/21   Faustino Foley DO       Allergies:  Codeine and Adhesive tape    Social History:      The patient currently lives at home with . TOBACCO:   reports that she has never smoked. She has never used smokeless tobacco.  ETOH:   reports no history of alcohol use.   E-Cigarettes/Vaping Use     Questions Responses    E-Cigarette/Vaping Use Never User    Start Date     Passive Exposure     Quit Date     Counseling Given     Comments         Family History:      Reviewed in detail; positive as follows:        Problem Relation Age of Onset    Diabetes Father     Cancer Father     Colon Cancer Father     Mental Retardation Sister     Diabetes Sister     High Blood Pressure Brother     Diabetes Sister      REVIEW OF SYSTEMS COMPLETED:    A 13-point review of systems was performed, pertinent positives are noted in the HPI or below; negative otherwise. HEENT: Notes URI-like symptoms two weeks ago preceding onset of this dizziness spell, symptoms included rhinitis and PND, did not require treatment. Psychiatric: Patient admits to high levels of stress recently with health concerns for her mother and patient &  house shopping      PHYSICAL EXAM PERFORMED:    BP (!) 150/83   Pulse 70   Temp 97.8 °F (36.6 °C) (Oral)   Resp 19   Ht 5' 9\" (1.753 m)   Wt 245 lb (111.1 kg)   SpO2 96%   BMI 36.18 kg/m²     General appearance:  No apparent distress, appears stated age and cooperative. HEENT:  Normal cephalic, atraumatic without obvious deformity. Pupils equal, round, and reactive to light. Extra ocular muscles intact. Conjunctivae/corneas clear. Neck: Supple, with full range of motion. No jugular venous distention was visualized. Trachea midline. Respiratory:  Normal respiratory effort. Clear to auscultation, bilaterally without Rales/Wheezes/Rhonchi. Cardiovascular:  Regular rate and rhythm with normal S1/S2 without murmurs, rubs or gallops. No LE edema noted. Abdomen: Soft, non-tender, non-distended with normal bowel sounds. Musculoskeletal:  No clubbing, cyanosis or edema bilaterally. Full range of motion without deformity. Skin: Skin color, texture, turgor normal.  No rashes or lesions. Neurologic:  Neurovascularly intact without any focal sensory/motor deficits.  Cranial nerves: II-XII intact, grossly non-focal. BUE strength equal, 4/5, BLE strength equal, 4/5. Psychiatric:  Alert and oriented, thought content appropriate, normal insight  Capillary Refill: Brisk,3 seconds, normal  Peripheral Pulses: +2 palpable, equal bilaterally     Labs:     Recent Labs     06/13/22  1446   WBC 8.4   HGB 12.3   HCT 36.7        Recent Labs     06/13/22  1446      K 3.9      CO2 26   BUN 12   CREATININE 0.6   CALCIUM 9.5     Recent Labs     06/13/22  1446   AST 28   ALT 28   BILITOT 0.5   ALKPHOS 124     No results for input(s): INR in the last 72 hours. Recent Labs     06/13/22  1446 06/13/22  1800   TROPONINI <0.01 <0.01     Urinalysis:      Lab Results   Component Value Date    NITRU Negative 06/13/2022    WBCUA >100 12/31/2021    BACTERIA 2+ 12/31/2021    RBCUA 21-50 12/31/2021    BLOODU Negative 06/13/2022    SPECGRAV 1.015 06/13/2022    GLUCOSEU Negative 06/13/2022     Radiology:     I have reviewed the radiographic results for this encounter with the following interpretation(s); see report(s) below:    CTA HEAD NECK W CONTRAST   Final Result   No acute abnormality or flow-limiting stenosis of the major arteries of the   head and neck. CT HEAD WO CONTRAST   Final Result   No acute intracranial abnormality. XR CHEST PORTABLE   Final Result   Clear lungs. EKG:  I have reviewed the EKG with the following interpretation in the absence of a cardiologist: NSR, 72 bpm, intervals normal but QTc nearing prolongation at 440ms, no acute ST-segment or T-wave deviations, slightly peaking p-waves in in leads II and aVF noting LA enlargement, normal axis, good R-wave progression; non-specific EKG    ASSESSMENT:    Active Hospital Problems    Diagnosis Date Noted    Chest pain [R07.9] 06/13/2022     Priority: High    Dizziness [R42] 06/13/2022     Priority: High    Uncontrolled hypertension [I10] 09/09/2015     Priority: High    Obesity (BMI 30-39. 9) [E66.9] 01/11/2016 PLAN:    1.) Chest pain  - EKG negative for acute ST-segment or T-wave deviations, isoenzymes negative x 2  - HEART Score 4  - Place in observation  - Continue troponin check  - ASA given in ED, continue daily  - Start statin therapy  - Order echocardiogram and treadmill GXT in AM  - Consult Cardiology    2.) Dizziness/ H/o vertigo  - intermittent episodes x 4 days, provoked with quick head motions  - Treated with meclizine, Pepcid, and Decadron in ED  - Symptoms nondescript for TIA, but risk factors present  - No recent PCP coverage, last lipid panel in 2020, ASCVD 10-year risk 5.8 using those lipid values with tonight's BP, no routine statin or ASA therapy  - CTh and CTA H/N unremarkable, no LVO  - Will consult Neurology  - Deferring decision for MRI to Neuro team  - Echo pending with included bubble study  - Lipid panel and A1c in AM    3.) Uncontrolled hypertension  - As mentioned above, has been lost to PCP follow-up since 2020  - Monotherapy with Cozaar 25mg once daily  - Start metoprolol tonight  - Add PRN agents for target BP <160/99    4.) Obesity  - BMI 36.18     DVT Prophylaxis: Lovenox  Diet: ADULT DIET; Regular; 3 carb choices (45 gm/meal); No Caffeine  Diet NPO  Code Status: Full Code    PT/OT Eval Status: Not indicated at this time    Dispo - 1-2 days per clinical improvement    OMAIRA Prieto - CNP    Thank you Kelsey GALVIN DO for the opportunity to be involved in this patient's care.  If you have any questions or concerns please feel free to contact me at [phone].---Anticipated co-signer, Dr. Yevgeniy Lancaster

## 2022-06-14 NOTE — CARE COORDINATION
CASE MANAGEMENT INITIAL ASSESSMENT      Reviewed chart and completed assessment with patient:yes  Family present: yes  Explained Case Management role/services. Primary contact information:Shayne Pérez, spouse [address] :     Vj Mercado, [phone]      Can this person be reached and be able to respond quickly, such as within a few minutes or hours? Yes  Who would be your back-up decision maker? Name [facility]  Phone Number:[phone]    Admit date/status:06/13/22  Diagnosis:Dizziness   Is this a Readmission?:  No      Insurance:Medical mutual   Precert required for SNF: Yes       3 night stay required: No    Living arrangements, Adls, care needs, prior to admission:condo with  and [address] Uses stairs, has HR    Durable Medical Equipment at home:  None    Services in the home and/or outpatient, prior to admission:none    Current PCP:Dr. Hayes- will be making a new Pt appt. To see her. Medications:yes Prescription coverage? Yes Will pt require financial assistance with medications No     Transportation needs: drives     Dialysis Facility (if applicable)   · Name:  · Address:  · Dialysis Schedule:  · Phone:  · Fax:    PT/OT recs:n/a    Hospital Exemption Notification (HEN):n/a    Barriers to discharge:none    Plan/comments:denies needs. No needs anticipated. Will sign off. Please consult if needs arise.

## 2022-06-14 NOTE — CONSULTS
CARDIOLOGY CONSULTATION        Patient Name: Ritesh Linn  Date of admission: 6/13/2022  2:24 PM  Admission Dx: Dizziness [R42]  Chest pain [R07.9]  Near syncope [R55]  Chest pain, unspecified type [R07.9]  Requesting Physician: [address], [place]  Primary Care physician: Cherelle GALVIN DO    Reason for Consultation/Chief Complaint: Dizziness, pre-syncope, chest pain    History of Present Illness: Ritesh Linn is a 65 y.o. patient with prior medical history notable for GERD, obesity, hypertension, and vertigo who presented to the hospital with complaints of headache, dizziness and chest pain x4 days prior to admission. ED course/Vital signs on presentation: /93, heart rate 78, afebrile, 9% on room air. EKG showed sinus rhythm with scooped appearance of ST segments inferiorly. Follow-up showed nonspecific T wave abnormality inferior leads with resolution of ST changes. Labs notable for normal electrolytes, normal kidney function, proBNP 34, negative troponins. Mild anemia, mild white count. Chest x-ray with normal cardiac silhouette and clear lungs. Today the patient describes 4 days of episodic dizziness leading up to admission. Typical episode is acute in onset, severe. Her dizziness as a feeling of imbalance. Feels like she's on a rollercoaster. Unlike her previous history of vertigo with room spinning exacerbated by quick movements of her head. She describes episodes of these occurring at rest (while in car, while seated in home). Non-exertional symptoms. As episode progresses, she feels \"tunnel vision\" and like she may pass out. Never LOC. No palpitations. +nausea but no emesis with episodes. No other strokelike symptoms. Separate from her pre-syncopal episodes, she Complains of left-sided chest discomfort described as a \"pulled muscle\" sensation. R chest. Nonradiating. No nausea/dyspnea. Describes a lot of stress at home at this time.    Denies paroxysmal nocturnal dyspnea, orthopnea, bendopnea, increasing lower extremity edema or weight gain. The patient endorses highest level of activity as walking up the 14 steps at [facility] where she lives. Carries groceries up stairs at the condo. Never smoker. Past Medical History:   has a past medical history of Bursitis, colonoscopy, Dyspnea, GERD (gastroesophageal reflux disease), and Hypertension. Surgical History:   has a past surgical history that includes Cholecystectomy and Colonoscopy (03/06/2018). Social History:   reports that she has never smoked. She has never used smokeless tobacco. She reports that she does not drink alcohol and does not use drugs. Retired. Living with daughter in [place], [place]. Family History:  family history includes Cancer in her father; Colon Cancer in her father; Diabetes in her father, sister, and sister; High Blood Pressure in her brother; Mental Retardation in her sister. Mother -estranged but did have cardiac history, unsure of details. Sister with AF during COVID-19. Home Medications:  Were reviewed and are listed in nursing record and/or below  Prior to Admission medications    Medication Sig Start Date End Date Taking?  Authorizing Provider   losartan (COZAAR) 50 MG tablet Take 1 tablet by mouth daily 1/3/22   Gracie Ya DO   ibuprofen (IBU) 600 MG tablet Take 1 tablet by mouth every 6 hours as needed for Pain  Patient not taking: Reported on 6/13/2022 12/31/21   Justin Jaime DO        CURRENT Medications:  sodium chloride flush 0.9 % injection 5-40 mL, 2 times per day  sodium chloride flush 0.9 % injection 5-40 mL, PRN  0.9 % sodium chloride infusion, PRN  acetaminophen (TYLENOL) tablet 650 mg, Q6H PRN   Or  acetaminophen (TYLENOL) suppository 650 mg, Q6H PRN  polyethylene glycol (GLYCOLAX) packet 17 g, Daily PRN  aspirin chewable tablet 81 mg, Daily  atorvastatin (LIPITOR) tablet 40 mg, Nightly  perflutren lipid microspheres (DEFINITY) injection 1.65 mg, ONCE PRN  promethazine (PHENERGAN) tablet 12.5 mg, Q6H PRN  enoxaparin (LOVENOX) injection 40 mg, Daily  metoprolol succinate (TOPROL XL) extended release tablet 25 mg, Daily        Allergies:  Codeine and Adhesive tape     Review of Systems:   A 14 point review of symptoms completed. Pertinent positives identified in the HPI, all other review of symptoms negative as below. Objective:     Vitals:    06/13/22 2249 06/13/22 2250 06/14/22 0401 06/14/22 0750   BP: (!) 150/83  121/64 128/71   Pulse: 70  69 76   Resp: 19  18 16   Temp: 97.8 °F (36.6 °C)  97.7 °F (36.5 °C) 97.5 °F (36.4 °C)   TempSrc: Oral   Oral   SpO2: 96%  97% 96%   Weight:  250 lb (113.4 kg)     Height:          Weight: 250 lb (113.4 kg)       PHYSICAL EXAM:      General:  Alert, cooperative, no distress, appears stated age   Head:  Normocephalic, atraumatic   Eyes:  Conjunctiva/corneas clear, anicteric sclerae    Nose: Nares normal, no drainage or sinus tenderness   Throat: No abnormalities of the lips, oral mucosa or tongue. Neck: Trachea midline. Neck supple with no lymphadenopathy, thyroid not enlarged, symmetric, no tenderness/mass/nodules    Lungs:   Clear to auscultation bilaterally, no wheezes, no rales, no respiratory distress   Chest Wall:  No deformity or tenderness to palpation   Heart:  Regular rate and rhythm, normal S1, normal S2, faint systolic murmur at the base, no rub, no S3/S4, PMI non-displaced. Abdomen:   Obese, Soft, non-tender, with normoactive bowel sounds. No masses, no hepatosplenomegaly   Extremities: No cyanosis, clubbing or pitting edema. Vascular: 2+ radial, dorsalis pedis and posterior tibial pulses bilaterally. Brisk carotid upstrokes without carotid bruit. Skin: Skin color, texture, turgor are normal with no rashes or ulceration. Pysch: Euthymic mood, appropriate affect   Neurologic: Oriented to person, place and time. No slurred speech or facial asymmetry.  No motor or sensory deficits on gross examination. Labs:   CBC:   Lab Results   Component Value Date    WBC 12.1 06/14/2022    RBC 3.95 06/14/2022    HGB 11.7 06/14/2022    HCT 35.7 06/14/2022    MCV 90.5 06/14/2022    RDW 13.1 06/14/2022     06/14/2022     CMP:  Lab Results   Component Value Date     06/14/2022    K 4.4 06/14/2022     06/14/2022    CO2 21 06/14/2022    BUN 13 06/14/2022    CREATININE <0.5 06/14/2022    GFRAA >60 06/14/2022    AGRATIO 1.5 06/13/2022    LABGLOM >60 06/14/2022    LABGLOM 111 04/11/2016    GLUCOSE 134 06/14/2022    PROT 8.1 06/13/2022    CALCIUM 9.9 06/14/2022    BILITOT 0.5 06/13/2022    ALKPHOS 124 06/13/2022    AST 28 06/13/2022    ALT 28 06/13/2022     PT/INR:  No results found for: PTINR  HgBA1c:  Lab Results   Component Value Date    LABA1C 5.6 10/08/2019     Lab Results   Component Value Date    TROPONINI <0.01 06/14/2022       Lab Results   Component Value Date    CHOL 209 (H) 10/23/2020    CHOL 190 10/08/2019    CHOL 186 05/04/2018     Lab Results   Component Value Date    TRIG 117 10/23/2020    TRIG 116 10/08/2019    TRIG 99 05/04/2018     Lab Results   Component Value Date    HDL 55 10/23/2020    HDL 62 (H) 10/08/2019    HDL 60 05/04/2018     Lab Results   Component Value Date    LDLCALC 131 (H) 10/23/2020    LDLCALC 105 (H) 10/08/2019    LDLCALC 106 (H) 05/04/2018     Lab Results   Component Value Date    LABVLDL 23 10/23/2020    LABVLDL 23 10/08/2019    LABVLDL 20 05/04/2018     Lab Results   Component Value Date    CHOLHDLRATIO 3.4 04/11/2016        Cardiac Data:     EKG: Personally reviewed by interpretation as above    Telemetry personally reviewed. NSR 60-70's, no events. 2014 Stress echo  Baseline Echo Findings: The LV ejection fraction at rest is 60-65%. All segments are normal.     Post Stress Echo Findings: The LV ejection fraction post stress is   >75%. Normal augmentation of all left ventricular segments.        Additional studies:     CTA head and neck largely

## 2022-06-14 NOTE — CONSULTS
Consult placed    [address] cardiology  Done at 2136  Date:6/14/2022,  Time:2:09 PM        Electronically signed by Sisi Adorno on 6/14/2022 at 2:09 PM Nurse's notes reviewed and accepted.    SUBJECTIVE: He presents with complaints of sinus pressure, cough and green sputum. He has been feeling ill for 3 weeks. He denies fever, chills, sore throat and ear pain. He may have had hemoptysis x 1.    OBJECTIVE: In general he is in no acute distress. On today's exam the tympanic membranes are normal, landmarks visualized bilaterally. The external auditory canals are normal. There is mild bilateral maxillary sinus pressure tenderness. No frontal sinus pressure tenderness. Nares are patent. Pharynx is pink and moist. The neck is supple. There is no adenopathy in the anterior cervical chain or supraclavicular region. Lungs are clear to auscultation and percussion. Heart has a regular rate and rhythm, no murmur, rub or S3.    Impression: Maxillary Sinusitis and Bronchitis.    Plan: Doxycycline.           F/U PMD 2 weeks if no better. Sooner if worse.

## 2022-06-14 NOTE — CONSULTS
In patient Neurology consult        [facility]      MD Rene Castro  1965    Date of Service: 6/14/2022    Referring Physician: Loman Habermann, MD      Reason for the consult and CC: Acute dizziness    HPI:   The patient is a 65 y.o.   female, with a PMH of HTN, vertigo, who presented to [facility] with acute dizziness. The patient notes for about the last 4 days, she has been having intermittent episodes of dizziness. She states this feels different from her usual vertigo, in that the room is not spinning. She instead, endorses a feeling of being on a ship, rocking back and forth. She endorses some mild nausea, but no vomiting. She notes the sensation is worse with any head movement. She denies fever, chills, headache, vision changes, dysarthria, dysphagia, tinnitus, focal weakness, and paraesthesias. She also complains of mild right-sided chest pain and reportedly had an abnormal EKG. Of note, orthostatic vitals were negative.           Family History   Problem Relation Age of Onset    Diabetes Father     Cancer Father     Colon Cancer Father     Mental Retardation Sister     Diabetes Sister     High Blood Pressure Brother     Diabetes Sister      Past Surgical History:   Procedure Laterality Date    CHOLECYSTECTOMY      COLONOSCOPY  03/06/2018        Past Medical History:   Diagnosis Date    Bursitis     colonoscopy 03/06/2018    Dyspnea     GERD (gastroesophageal reflux disease)     Hypertension      Social History     Tobacco Use    Smoking status: Never Smoker    Smokeless tobacco: Never Used   Vaping Use    Vaping Use: Never used   Substance Use Topics    Alcohol use: No    Drug use: No     Allergies   Allergen Reactions    Codeine Hives    Adhesive Tape Other (See Comments)     Adhesives from EKG left very red      Current Facility-Administered Medications   Medication Dose Route Frequency Provider Last Rate Last Admin    sodium chloride flush 0.9 % injection 5-40 mL  5-40 mL IntraVENous 2 times per day Jean OMAIRA Duarte - CNP   10 mL at 06/14/22 0751    sodium chloride flush 0.9 % injection 5-40 mL  5-40 mL IntraVENous PRN Jean Gerald, APRN - CNP        0.9 % sodium chloride infusion   IntraVENous PRN Jean EDNA DuarteN - CNP        acetaminophen (TYLENOL) tablet 650 mg  650 mg Oral Q6H PRN Jean Duarte APRN - CNP        Or    acetaminophen (TYLENOL) suppository 650 mg  650 mg Rectal Q6H PRN Jean Gerald, APRN - CNP        polyethylene glycol (GLYCOLAX) packet 17 g  17 g Oral Daily PRN Jean Duarte, APRN - CNP        aspirin chewable tablet 81 mg  81 mg Oral Daily Jean Gerald, OMAIRA - CNP   81 mg at 06/14/22 3364    atorvastatin (LIPITOR) tablet 40 mg  40 mg Oral Nightly OMAIRA Boyd - MARITZA        perflutren lipid microspheres (DEFINITY) injection 1.65 mg  1.5 mL IntraVENous ONCE PRN Jean Duarte APRN - MARITZA        promethazine (PHENERGAN) tablet 12.5 mg  12.5 mg Oral Q6H PRN Jean Duarte, APRN - MARITZA        enoxaparin (LOVENOX) injection 40 mg  40 mg SubCUTAneous Daily OMAIRA Boyd CNP        [Held by provider] metoprolol succinate (TOPROL XL) extended release tablet 25 mg  25 mg Oral Daily OMAIRA Jade CNP           ROS : A 10-14 system review of constitutional, cardiovascular, respiratory, eyes, musculoskeletal, endocrine, GI, ENT, skin, hematological, genitourinary, psychiatric and neurologic systems was obtained and updated today and is unremarkable except as mentioned in my HPI      Exam:     Constitutional:   Vitals:    06/13/22 2249 06/13/22 2250 06/14/22 0401 06/14/22 0750   BP: (!) 150/83  121/64 128/71   Pulse: 70  69 76   Resp: 19  18 16   Temp: 97.8 °F (36.6 °C)  97.7 °F (36.5 °C) 97.5 °F (36.4 °C)   TempSrc: Oral   Oral   SpO2: 96%  97% 96%   Weight:  250 lb (113.4 kg)     Height:           General appearance and observation: Normal development and appear in no acute distress. Eye:  Fundus: No blurring of optic disc. Neck: supple  Cardiovascular: No lower leg edema with good pulsation. Mental Status:   Oriented to person, place, problem, and time. Memory: Good immediate recall. Intact remote memory  Normal attention span and concentration. Language: intact naming, repeating and fluency   Good fund of Knowledge. Aware of current events and vocabulary   Cranial Nerves:   II: Visual fields: Full. Pupils: equal, round, reactive to light  III,IV,VI: Extra Ocular Movements are intact. No nystagmus  V: Facial sensation is intact  VII: Facial strength and movements: intact and symmetric  VIII: Hearing: Intact  IX: Palate elevation is symmetric  XI: Shoulder shrug is intact  XII: Tongue movements are normal  Musculoskeletal: 5/5 in all 4 extremities. Tone: Normal tone. Reflexes: Symmetric 2+ in the arms and 2+ in the legs   Planters: flexor bilaterally. Coordination: no pronator drift, no dysmetria with FNF in upper extremities. Normal REM. Sensation: normal to all modalities in both arms and legs. Gait/Posture: steady gait and normal posturing.         Data:  LABS:   Lab Results   Component Value Date     06/14/2022    K 4.4 06/14/2022     06/14/2022    CO2 21 06/14/2022    BUN 13 06/14/2022    CREATININE <0.5 06/14/2022    GFRAA >60 06/14/2022    LABGLOM >60 06/14/2022    LABGLOM 111 04/11/2016    GLUCOSE 134 06/14/2022    MG 2.00 06/13/2022    CALCIUM 9.9 06/14/2022     Lab Results   Component Value Date    WBC 12.1 06/14/2022    RBC 3.95 06/14/2022    HGB 11.7 06/14/2022    HCT 35.7 06/14/2022    MCV 90.5 06/14/2022    RDW 13.1 06/14/2022     06/14/2022     Lab Results   Component Value Date    INR 1.03 09/25/2018    PROTIME 11.7 09/25/2018       Neuroimaging was independently reviewed by myself and discussed results with the patient and/or family  Reviewed notes from different physicians  Reviewed lab and blood testing    Impression:    Acute dizziness - likely BPPV, but will rule out posterior CVA given risk factors. CT head negative. CTA head/neck without LVO. HTN, uncontrolled. HLD  Obesity  Abnormal EKG    Recommendation:     MRI of brain and ECHO ordered. Cardiology consulted for abnormal EKG and planning for stress test.    Monitor on tele. Initiated on ASA, statin. Continue home BP meds. F/u A1c, lipid panel. Vestibular training. Thank you for referring such patient. If you have any questions regarding my consult note, please don't hesitate to call me. Flaco Long, CNP    Attending Supervising [de-identified] Attestation Statement      The patient was seen 6/14/2022 in conjunction with the nurse practitioner with independent history, evaluation and examination. I agree with the note which has been adjusted to reflect my findings, with the addition of the following: The patient is 65 y.o.  female  who was admitted for   new onset dizziness and vertigo. Onset few days ago. Degree is severe. Duration was persistent. Description lightheadedness with occasional spinning. No other relieving or aggravating factors. No headache, chest pain, dysphagia with some nausea. She was admitted to the hospital.  Today she denies any other new symptoms. On examination:  No acute distress  Awake and alert x3. Fluent speech. Appears appropriate with intact recent and remote memory. Pupil reactive and symmetric, extraocular motor intact, no ophthalmoplegia, face is symmetric and tongue is midline. Mild lateral nystagmus  No focal weakness with symmetric DTR  Normal tone  No sensory disturbance or abnormal movement    Impression:   Acute and new onset dizziness, not controlled.   Possible peripheral vertigo vs new ischemic stroke  Hypertension, not controlled  Hyperlipidemia    Recommendation:  MRI brain  Echo  Telemetry  PT and OT  Aspirin   Statin  Blood pressure control and continue current medications  Stroke education  Vestibular precautions at home  Can be discharged from neurology if MRI showed no acute stroke. Electronically signed by Rafal Mcdaniel MD on 6/14/22 at 2:26 PM EDT        This dictation was generated by voice recognition computer software.  Although all attempts are made to edit the dictation for accuracy, there may be errors in the  transcription that are not intended

## 2022-06-15 ENCOUNTER — TELEPHONE (OUTPATIENT)
Dept: CARDIOLOGY | Age: 57
End: 2022-06-15

## 2022-06-15 VITALS
BODY MASS INDEX: 37.03 KG/M2 | HEART RATE: 71 BPM | TEMPERATURE: 97.8 F | RESPIRATION RATE: 17 BRPM | SYSTOLIC BLOOD PRESSURE: 136 MMHG | OXYGEN SATURATION: 94 % | HEIGHT: 69 IN | DIASTOLIC BLOOD PRESSURE: 79 MMHG | WEIGHT: 250 LBS

## 2022-06-15 LAB
LV EF: 58 %
LVEF MODALITY: NORMAL

## 2022-06-15 PROCEDURE — G0378 HOSPITAL OBSERVATION PER HR: HCPCS

## 2022-06-15 PROCEDURE — 93306 TTE W/DOPPLER COMPLETE: CPT

## 2022-06-15 PROCEDURE — 99225 PR SBSQ OBSERVATION CARE/DAY 25 MINUTES: CPT | Performed by: NURSE PRACTITIONER

## 2022-06-15 PROCEDURE — 6370000000 HC RX 637 (ALT 250 FOR IP): Performed by: NURSE PRACTITIONER

## 2022-06-15 PROCEDURE — 99215 OFFICE O/P EST HI 40 MIN: CPT | Performed by: INTERNAL MEDICINE

## 2022-06-15 PROCEDURE — 96372 THER/PROPH/DIAG INJ SC/IM: CPT

## 2022-06-15 PROCEDURE — 2580000003 HC RX 258: Performed by: NURSE PRACTITIONER

## 2022-06-15 PROCEDURE — 6360000002 HC RX W HCPCS: Performed by: NURSE PRACTITIONER

## 2022-06-15 RX ORDER — ASPIRIN 81 MG/1
81 TABLET, CHEWABLE ORAL DAILY
Qty: 30 TABLET | Refills: 3 | Status: SHIPPED | OUTPATIENT
Start: 2022-06-15

## 2022-06-15 RX ORDER — ATORVASTATIN CALCIUM 40 MG/1
40 TABLET, FILM COATED ORAL NIGHTLY
Qty: 30 TABLET | Refills: 3 | Status: SHIPPED | OUTPATIENT
Start: 2022-06-15 | End: 2022-10-11 | Stop reason: SDUPTHER

## 2022-06-15 RX ORDER — LOSARTAN POTASSIUM 25 MG/1
25 TABLET ORAL DAILY
COMMUNITY

## 2022-06-15 RX ADMIN — ENOXAPARIN SODIUM 40 MG: 100 INJECTION SUBCUTANEOUS at 10:30

## 2022-06-15 RX ADMIN — SODIUM CHLORIDE, PRESERVATIVE FREE 10 ML: 5 INJECTION INTRAVENOUS at 10:30

## 2022-06-15 RX ADMIN — ASPIRIN 81 MG: 81 TABLET, CHEWABLE ORAL at 10:30

## 2022-06-15 ASSESSMENT — PAIN SCALES - GENERAL: PAINLEVEL_OUTOF10: 0

## 2022-06-15 NOTE — PLAN OF CARE
Problem: Discharge Planning  Goal: Discharge to home or other facility with appropriate resources  Outcome: Progressing  Flowsheets (Taken 6/15/2022 0839)  Discharge to home or other facility with appropriate resources: Identify barriers to discharge with patient and caregiver     Problem: Pain  Goal: Verbalizes/displays adequate comfort level or baseline comfort level  Outcome: Progressing  Flowsheets (Taken 6/15/2022 0830)  Verbalizes/displays adequate comfort level or baseline comfort level: Assess pain using appropriate pain scale

## 2022-06-15 NOTE — PROGRESS NOTES
Monitor company [employer]  Length of monitor 14 days  Monitor ordered by Mckayla Wilcox MD  Patch ID [id number]  Device number [id number]  Nurse to apply at the time of discharge. Monitor given to Lee Calhoun RN.

## 2022-06-15 NOTE — PROGRESS NOTES
CARDIOLOGY PROGRESS NOTE        Patient Name: Gerard Dance  Date of admission: 6/13/2022  2:24 PM  Admission Dx: Dizziness [R42]  Chest pain [R07.9]  Near syncope [R55]  Chest pain, unspecified type [R07.9]  Requesting Physician: Anatoly Pham MD  Primary Care physician: Felicita GALVIN DO    Reason for Consultation/Chief Complaint: Dizziness, pre-syncope, chest pain    History of Present Illness: Gerard Dance is a 65 y.o. patient with prior medical history notable for GERD, obesity, hypertension, and vertigo who presented to the hospital with complaints of headache, dizziness and chest pain x4 days prior to admission. ED course/Vital signs on presentation: /93, heart rate 78, afebrile. EKG showed sinus rhythm with scooped appearance of ST segments inferiorly. Follow-up showed nonspecific T wave abnormality inferior leads with resolution of ST changes. Labs notable for normal electrolytes, normal kidney function, proBNP 34, negative troponins. Mild anemia, mild white count. Chest x-ray with normal cardiac silhouette and clear lungs. On initial evaluation patient described episodes of dizziness described as tunnel vision, presyncope without loss of consciousness. States she fell like she was on a roller coaster. States these were unlike previous episodes of vertigo with room spinning. Mostly occurring at rest.    She underwent stress test yesterday which was normal.    Today, states she feels well. Does feel a little shaky after having her coffee. Feels a little foggy. No episodes of pre-syncopal symptoms since admission. Tele shows no events. No chest pains. No palpitations. Past Medical History:   has a past medical history of Bursitis, colonoscopy, Dyspnea, GERD (gastroesophageal reflux disease), and Hypertension. Surgical History:   has a past surgical history that includes Cholecystectomy and Colonoscopy (03/06/2018).      Social History:   reports that she has never smoked. She has never used smokeless tobacco. She reports that she does not drink alcohol and does not use drugs. Retired. Living with daughter in [facility]. Family History:  family history includes Cancer in her father; Colon Cancer in her father; Diabetes in her father, sister, and sister; High Blood Pressure in her brother; Mental Retardation in her sister. Mother -estranged but did have cardiac history, unsure of details. Sister with AF during COVID-19. Home Medications:  Were reviewed and are listed in nursing record and/or below  Prior to Admission medications    Medication Sig Start Date End Date Taking? Authorizing Provider   losartan (COZAAR) 25 MG tablet Take 25 mg by mouth daily   Yes Historical Provider, MD   ibuprofen (IBU) 600 MG tablet Take 1 tablet by mouth every 6 hours as needed for Pain  Patient not taking: Reported on 6/13/2022 12/31/21   Nereida Sandhoff, DO        CURRENT Medications:  sodium chloride flush 0.9 % injection 5-40 mL, 2 times per day  sodium chloride flush 0.9 % injection 5-40 mL, PRN  0.9 % sodium chloride infusion, PRN  acetaminophen (TYLENOL) tablet 650 mg, Q6H PRN   Or  acetaminophen (TYLENOL) suppository 650 mg, Q6H PRN  polyethylene glycol (GLYCOLAX) packet 17 g, Daily PRN  aspirin chewable tablet 81 mg, Daily  atorvastatin (LIPITOR) tablet 40 mg, Nightly  perflutren lipid microspheres (DEFINITY) injection 1.65 mg, ONCE PRN  promethazine (PHENERGAN) tablet 12.5 mg, Q6H PRN  enoxaparin (LOVENOX) injection 40 mg, Daily  [Held by provider] metoprolol succinate (TOPROL XL) extended release tablet 25 mg, Daily        Allergies:  Codeine and Adhesive tape     Review of Systems:   A 14 point review of symptoms completed. Pertinent positives identified in the HPI, all other review of symptoms negative as below.       Objective:     Vitals:    06/14/22 1914 06/14/22 2332 06/15/22 0352 06/15/22 0830   BP: 131/65 123/65 124/72 128/62   Pulse: 75 70 61 67   Resp: 16 16 15 16   Temp: 97.6 °F (36.4 °C) 97.6 °F (36.4 °C) 97.5 °F (36.4 °C) 97.5 °F (36.4 °C)   TempSrc:  Oral  Oral   SpO2: 97% 94% 97% 97%   Weight:       Height:          Weight: 250 lb (113.4 kg)       PHYSICAL EXAM:      General:  Alert, cooperative, no distress, appears stated age   Head:  Normocephalic, atraumatic   Eyes:  Conjunctiva/corneas clear, anicteric sclerae    Nose: Nares normal, no drainage or sinus tenderness   Throat: No abnormalities of the lips, oral mucosa or tongue. Neck: Trachea midline. Neck supple with no lymphadenopathy, thyroid not enlarged, symmetric, no tenderness/mass/nodules    Lungs:   Clear to auscultation bilaterally, no wheezes, no rales, no respiratory distress   Chest Wall:  No deformity or tenderness to palpation   Heart:  Regular rate and rhythm, normal S1, normal S2, faint early systolic murmur at the base without  no rub, no S3/S4, PMI non-displaced. Abdomen:   Obese, Soft, non-tender, with normoactive bowel sounds. No masses, no hepatosplenomegaly   Extremities: No cyanosis, clubbing or pitting edema. Vascular: 2+ radial, dorsalis pedis and posterior tibial pulses bilaterally. Brisk carotid upstrokes without carotid bruit. Skin: Skin color, texture, turgor are normal with no rashes or ulceration. Pysch: Euthymic mood, appropriate affect   Neurologic: Oriented to person, place and time. No slurred speech or facial asymmetry. No motor or sensory deficits on gross examination.          Labs:   CBC:   Lab Results   Component Value Date    WBC 12.1 06/14/2022    RBC 3.95 06/14/2022    HGB 11.7 06/14/2022    HCT 35.7 06/14/2022    MCV 90.5 06/14/2022    RDW 13.1 06/14/2022     06/14/2022     CMP:  Lab Results   Component Value Date     06/14/2022    K 4.4 06/14/2022     06/14/2022    CO2 21 06/14/2022    BUN 13 06/14/2022    CREATININE <0.5 06/14/2022    GFRAA >60 06/14/2022    AGRATIO 1.5 06/13/2022    LABGLOM >60 06/14/2022    LABGLOM 111 04/11/2016    GLUCOSE 134 06/14/2022    PROT 8.1 06/13/2022    CALCIUM 9.9 06/14/2022    BILITOT 0.5 06/13/2022    ALKPHOS 124 06/13/2022    AST 28 06/13/2022    ALT 28 06/13/2022     PT/INR:  No results found for: PTINR  HgBA1c:  Lab Results   Component Value Date    LABA1C 5.9 06/14/2022     Lab Results   Component Value Date    TROPONINI <0.01 06/14/2022       Lab Results   Component Value Date    CHOL 209 (H) 06/14/2022    CHOL 209 (H) 10/23/2020    CHOL 190 10/08/2019     Lab Results   Component Value Date    TRIG 71 06/14/2022    TRIG 117 10/23/2020    TRIG 116 10/08/2019     Lab Results   Component Value Date    HDL 60 06/14/2022    HDL 55 10/23/2020    HDL 62 (H) 10/08/2019     Lab Results   Component Value Date    LDLCALC 135 (H) 06/14/2022    LDLCALC 131 (H) 10/23/2020    LDLCALC 105 (H) 10/08/2019     Lab Results   Component Value Date    LABVLDL 14 06/14/2022    LABVLDL 23 10/23/2020    LABVLDL 23 10/08/2019     Lab Results   Component Value Date    CHOLHDLRATIO 3.4 04/11/2016        Cardiac Data:     EKG: Personally reviewed by interpretation as above    Telemetry personally reviewed. NSR, no events. Stress 6/13/22  Summary    There is normal isotope uptake at stress and rest with mild breast    attenuation artifact. There is no evidence of myocardial ischemia or scar.    Normal left ventricular size and function. Post-stress LVEF >70%. ECG    changes noted in setting of hypertensive response to exercise.           Additional studies:     CTA head and neck largely unremarkable. Impression and Plan:      Chest pain, atypical   Abnormal EKG  -follow up echo  -Normal stress test   -ok to continue aspirin/statin for now  -no indication for metoprolol at this point will DC    Uncontrolled hypertension, now at goal   -ARB at home.  Continue to monitor    Dizziness/Imbalance/pre-syncope    Obesity with BMI 37  -I encouraged modest weight loss through implementing appropriate dietary measures as well as initiation of a graded exercise program with the ultimate goal of 150 minutes of aerobic exercise weekly. Family history of CV disease        Will follow up echo. If ok, will Plan cardiac monitor x 2 weeks at [place] with [place] today with 1 month follow up with me. ADDENDUM:  Patient's echo was personally reviewed. Normal LV/RV function. She does have aortic sclerosis underlying her heart murmur with no significant valve disease otherwise noted. Incidental finding of small intracardiac shunt. Likely PFO. I think this is bystander in this situation as her MRI was negative for stroke and she may well have BPPV. Would not recommend closure based on this and will reevaluate on outpatient basis. Discharge with cardiac monitor x2 weeks. Patient Active Problem List   Diagnosis    Uncontrolled hypertension    Obesity (BMI 30-39. 9)    Need for diphtheria-tetanus-pertussis (Tdap) vaccine    Chest pain    Dizziness    Peripheral vertigo, bilateral         I will address the patient's cardiac risk factors and adjusted pharmacologic treatment as needed. In addition, I have reinforced the need for patient directed risk factor modification. All questions and concerns were addressed to the patient/family. Alternatives to my treatment were discussed. Thank you for allowing us to participate in the care of Kelly Sheppard. Please call me with any questions [phone].     Manuel Leone MD, [facility]  Cardiovascular Disease  [facility]  [phone] [facility]  [phone] [address] [place] [address]  6/15/2022 9:26 AM

## 2022-06-15 NOTE — TELEPHONE ENCOUNTER
Monitor company [employer]  Length of monitor 14 days  Monitor ordered by Beverly Herrera MD  Patch ID [id number]  Device number [id number]  Nurse to apply at the time of discharge. Monitor given to Ross Carlson RN.

## 2022-06-15 NOTE — PROGRESS NOTES
Pt. States she has been taking a half losartan 50mg daily and has metoprolol xl 25mg daily that has been held since she came in.

## 2022-06-15 NOTE — CARE COORDINATION
CASE MANAGEMENT DISCHARGE SUMMARY      Discharge to: home, no needs     IMM given: (date) NA         Transportation:    Family/car:      Confirmed discharge plan with:     Patient: yes    Family:   No; pt to contact          RN, name: Marii Beaulieu    Note: Discharging nurse to complete ELISABETH, reconcile AVS, and place final copy with patient's discharge packet. RN to ensure that written prescriptions for  Level II medications are sent with patient to the facility as per protocol.

## 2022-06-15 NOTE — PROGRESS NOTES
Afsaneh Abernathy  Neurology Follow-up  [facility] Neurology    Date of Service: 6/15/2022    Subjective:   CC: Follow up today regarding: Acute dizziness    Events noted. Chart and lab reviewed. Hospital day 2. Feels about the same. MRI findings discussed with patient and  at bedside. ROS : A 10-12 system review obtained and updated today and is unremarkable except as mentioned  in my interval history. family history includes Cancer in her father; Crowell Eth in her father; Diabetes in her father, sister, and sister; High Blood Pressure in her brother; Mental Retardation in her sister.     Past Medical History:   Diagnosis Date    Bursitis     colonoscopy 03/06/2018    Dyspnea     GERD (gastroesophageal reflux disease)     Hypertension      Current Facility-Administered Medications   Medication Dose Route Frequency Provider Last Rate Last Admin    sodium chloride flush 0.9 % injection 5-40 mL  5-40 mL IntraVENous 2 times per day Reda EDNA CarpenterN - CNP   10 mL at 06/15/22 1030    sodium chloride flush 0.9 % injection 5-40 mL  5-40 mL IntraVENous PRN Reda Pauline APRN - CNP        0.9 % sodium chloride infusion   IntraVENous PRN Reda Pauline, APRN - CNP        acetaminophen (TYLENOL) tablet 650 mg  650 mg Oral Q6H PRN Reda Pauline APRN - CNP        Or    acetaminophen (TYLENOL) suppository 650 mg  650 mg Rectal Q6H PRN Reda Pauline, APRN - CNP        polyethylene glycol (GLYCOLAX) packet 17 g  17 g Oral Daily PRN Reda Pauline, APRN - CNP        aspirin chewable tablet 81 mg  81 mg Oral Daily Reda Pauline APRN - CNP   81 mg at 06/15/22 1030    atorvastatin (LIPITOR) tablet 40 mg  40 mg Oral Nightly Reda EDNA CarpenterN - CNP        perflutren lipid microspheres (DEFINITY) injection 1.65 mg  1.5 mL IntraVENous ONCE PRN Reda Pauline APRN - CNP        promethazine (PHENERGAN) tablet 12.5 mg  12.5 mg Oral Q6H PRN Redandrea Carpenter, APRN - CNP        enoxaparin (LOVENOX) injection 40 mg  40 mg SubCUTAneous Daily Sofia Villa APRN - CNP   40 mg at 06/15/22 1030    [Held by provider] metoprolol succinate (TOPROL XL) extended release tablet 25 mg  25 mg Oral Daily Sofia Villa, APRN - CNP         Allergies   Allergen Reactions    Codeine Hives    Adhesive Tape Other (See Comments)     Adhesives from EKG left very red       reports that she has never smoked. She has never used smokeless tobacco. She reports that she does not drink alcohol and does not use drugs. Objective:  Exam:   Constitutional:   Vitals:    06/15/22 0352 06/15/22 0830 06/15/22 1030 06/15/22 1204   BP: 124/72 128/62 (!) 140/74 136/79   Pulse: 61 67 73 71   Resp: 15 16  17   Temp: 97.5 °F (36.4 °C) 97.5 °F (36.4 °C)  97.8 °F (36.6 °C)   TempSrc:  Oral  Oral   SpO2: 97% 97%  94%   Weight:       Height:         General appearance:  Normal development and appear in no acute distress. Mental Status:   Oriented to person, place, problem, and time. Memory: Good immediate recall. Intact remote memory  Normal attention span and concentration. Language: intact naming, repeating and fluency   Good fund of Knowledge. Cranial Nerves:   II: Visual fields: Full. Pupils: equal, round, reactive to light  III,IV,VI: Extra Ocular Movements are intact. No nystagmus  V: Facial sensation is intact  VII: Facial strength and movements: intact and symmetric  IX: Palate elevation is symmetric  XI: Shoulder shrug is intact  XII: Tongue movements are normal  Musculoskeletal: 5/5 in all 4 extremities. Tone: Normal tone. Reflexes: Symmetric 2+ in both arms and legs. Coordination: no pronator drift, no dysmetria with FNF  Sensation: normal to all modalities in both arms and legs.   Gait/Posture: steady gait        Data:  LABS:   Lab Results   Component Value Date     06/14/2022    K 4.4 06/14/2022     06/14/2022    CO2 21 06/14/2022    BUN 13 06/14/2022    CREATININE <0.5 06/14/2022 GFRAA >60 06/14/2022    LABGLOM >60 06/14/2022    LABGLOM 111 04/11/2016    GLUCOSE 134 06/14/2022    MG 2.00 06/13/2022    CALCIUM 9.9 06/14/2022     Lab Results   Component Value Date    WBC 12.1 06/14/2022    RBC 3.95 06/14/2022    HGB 11.7 06/14/2022    HCT 35.7 06/14/2022    MCV 90.5 06/14/2022    RDW 13.1 06/14/2022     06/14/2022     Lab Results   Component Value Date    INR 1.03 09/25/2018    PROTIME 11.7 09/25/2018       Neuroimaging was independently reviewed by me and discussed results with the patient  I reviewed blood testing and other test results and discussed results with the patient      Impression:    Acute dizziness - likely BPPV. CT head negative. CTA head/neck without LVO. MRI of brain negative. ECHO with small, incidental, PFO.  HTN, uncontrolled. Prediabetes, A1c 5.9  HLD, uncontrolled. . Obesity  Abnormal EKG    Recommendation    Monitor on tele. Initiated on ASA, statin and would continue at dc. Continue home BP meds. Vestibular training. May be discharged from a neurological perspective when medically stable. Discussed monitoring BP at home and keeping a journal to share with PCP. Cardiology ordered 2 week event monitor.         Bi Bravo CNP      This dictation was generated by voice recognition computer software. Although all attempts are made to edit the dictation for accuracy, there may be errors in the transcription that are not intended.

## 2022-06-15 NOTE — PROGRESS NOTES
Patient discharged. IV removed, telemetry box and leads removed and returned. Lockbox emptied. All belongings gathered and returned to patient. Discharge instructions reviewed with patient, all questions answered by RN. Telemetry placed on pt. No further needs.

## 2022-06-15 NOTE — PROGRESS NOTES
To MRI dept, [facility] notified PAST MEDICAL HISTORY:  Breast cancer bilateral s/p RT 2009    Depression     HLD (hyperlipidemia)     HTN (hypertension)     Rectal prolapse

## 2022-06-15 NOTE — DISCHARGE SUMMARY
Hospital Medicine Discharge Summary    Patient ID: Carlos Day      Patient's PCP: Keira Hahn DO    Admit Date: 6/13/2022     Discharge Date:   6/15/22    Admitting Provider: Stanislav Anderson MD     Discharge Provider: Cristel Duran MD     Discharge Diagnoses: Active Hospital Problems    Diagnosis     Peripheral vertigo, bilateral [H81.393]      Priority: Medium    Chest pain [R07.9]      Priority: Medium    Dizziness [R42]      Priority: Medium    Obesity (BMI 30-39. 9) [E66.9]     Uncontrolled hypertension [I10]        The patient was seen and examined on day of discharge and this discharge summary is in conjunction with any daily progress note from day of discharge. Hospital Course:   Patient is a 50-year-old female with a past medical history of hypertension, vertigo and IBS who presented to [facility] with a complaint of 4 days worth of headache, dizziness and chest pain. She states her most significant symptoms were on Sunday while she was riding in a car. She states she has a history of vertigo however this dizziness feels different. The dizziness comes and goes randomly and lasts only minutes. She seems to think that it is exacerbated by quick head movements. Her chest pain is on the right side of her chest.  She describes it as sharp and stabbing with a max of 5 out of 10. She does endorse bilateral neck pain. No associated arm pain. She does endorse mild shortness of breath however she has this at baseline. She does have some nausea.     Work-up in the ED showed a chest x-ray negative for acute process. CT head and CT head and neck were negative for acute findings. EKG showed normal sinus rhythm.   She has had a troponin negative x4.    1.  Chest pain, ruled out ACS, POA  - EKG completed in the ED was negative for acute ST or T wave changes  - Troponin negative x4  - Continue 81 mg of aspirin   - Continue atorvastatin 40 mg  - Stress testing was normal  - Echo was negative but did show an intracardiac shunt, will discharge with a cardiac monitor  - 1 month follow-up with cardiology      2. Dizziness, history of vertigo, POA  - Intermittent episodes for the last 4 days, provoked with quick head movements  - Likely BPPV  - Patient received meclizine, Pepcid and Decadron in the ED  - Neurology consulted and appreciate their recommendations              - MRI was negative   - Echo was normal    - Vestibular training      3. Hypertension  - Uncontrolled  - Patient has been lost to follow-up since 2020  - Continue losartan 25 mg daily      4. Obesity  - BMI 36.92  - Hemoglobin A1c of 5.9%, prediabetes   - TSH normal at 0.96, T4 normal at 1.1      Physical Exam Performed:   /62   Pulse 67   Temp 97.5 °F (36.4 °C) (Oral)   Resp 16   Ht 5' 9\" (1.753 m)   Wt 250 lb (113.4 kg)   SpO2 97%   BMI 36.92 kg/m²       General appearance:  No apparent distress, appears stated age and cooperative. HEENT:  Normal cephalic, atraumatic without obvious deformity. Conjunctivae/corneas clear. Neck: No jugular venous distention. Respiratory:  Normal respiratory effort. Clear to auscultation, bilaterally without Rales/Wheezes/Rhonchi. Cardiovascular:  Regular rate and rhythm without murmurs, rubs or gallops. Abdomen: Soft, non-tender, non-distended with normal bowel sounds. Musculoskeletal:  No clubbing, cyanosis or edema bilaterally. Full range of motion without deformity. Skin: Skin color, texture, turgor normal.  No rashes or lesions. Neurologic:  Neurovascularly intact without any focal sensory/motor deficits. Psychiatric:  Alert and oriented, thought content appropriate, normal insight    Labs:  For convenience and continuity at follow-up the following most recent labs are provided:      CBC:    Lab Results   Component Value Date    WBC 12.1 06/14/2022    HGB 11.7 06/14/2022    HCT 35.7 06/14/2022     06/14/2022       Renal:    Lab Results   Component Value Date  06/14/2022    K 4.4 06/14/2022     06/14/2022    CO2 21 06/14/2022    BUN 13 06/14/2022    CREATININE <0.5 06/14/2022    CALCIUM 9.9 06/14/2022         Significant Diagnostic Studies    Radiology:   MRI BRAIN WO CONTRAST   Final Result   1. No acute infarct or acute intracranial process identified. 2. Nonspecific foci of white matter signal abnormality suggesting mild   chronic small vessel ischemic changes. NM Cardiac Stress Test Nuclear Imaging   Final Result      CTA HEAD NECK W CONTRAST   Final Result   No acute abnormality or flow-limiting stenosis of the major arteries of the   head and neck. CT HEAD WO CONTRAST   Final Result   No acute intracranial abnormality. XR CHEST PORTABLE   Final Result   Clear lungs. Consults:   IP CONSULT TO CARDIOLOGY  IP CONSULT TO NEUROLOGY    Disposition:  To home    Condition at Discharge: Stable    Discharge Instructions/Follow-up:  Follow-up with PCP and cardiology     Code Status:  Full Code     Activity: activity as tolerated    Diet: regular diet      Discharge Medications:   Discharge Medication List as of 6/15/2022 12:23 PM           Details   aspirin 81 MG chewable tablet Take 1 tablet by mouth daily, Disp-30 tablet, R-3Normal      atorvastatin (LIPITOR) 40 MG tablet Take 1 tablet by mouth nightly, Disp-30 tablet, R-3Normal              Details   losartan (COZAAR) 25 MG tablet Take 25 mg by mouth dailyHistorical Med             Time Spent on discharge is more than 1 hour in the examination, evaluation, counseling and review of medications and discharge plan. Signed:    Graciela Luna DO   6/15/2022      Thank you Sherri Dubin BHATT, DO for the opportunity to be involved in this patient's care. If you have any questions or concerns, please feel free to contact me at [phone]. Addendum to Resident DC summary note:  Pt seen,examined and evaluated with resident and discussed regarding POC .  I have reviewed the current history, physical findings, labs and assessment and plan and agree with note as documented by resident DO ( Dr. Trisha Johnson)    Calleen Heap to DC home and follow-up with PCP and cardiology as instructed      Mariia Stewart MD  Hospitalist Physician

## 2022-07-01 ENCOUNTER — TELEPHONE (OUTPATIENT)
Dept: PRIMARY CARE CLINIC | Age: 57
End: 2022-07-01

## 2022-07-01 DIAGNOSIS — R42 DIZZINESS: Primary | ICD-10-CM

## 2022-07-01 RX ORDER — MECLIZINE HCL 12.5 MG/1
12.5 TABLET ORAL 3 TIMES DAILY PRN
Qty: 15 TABLET | Refills: 0 | Status: SHIPPED | OUTPATIENT
Start: 2022-07-01 | End: 2022-07-06

## 2022-07-01 NOTE — TELEPHONE ENCOUNTER
Pt saw Dr. Aaron Ocasio and Dr. Hayes while in the hospital.  6/13-6/15    Pt scheduled for Dr. Aaron Ocasio first available 8/4. However I let the Pt know that we would speak with the  And try to get her in sooner. Pt states that she was not given any medication for her Vertigo when she left the hospital.     Pt says that she is not feeling very good today. If possible she would like something for her vertigo until she can get in for an appt with.

## 2022-07-07 PROCEDURE — 93228 REMOTE 30 DAY ECG REV/REPORT: CPT | Performed by: INTERNAL MEDICINE

## 2022-07-12 DIAGNOSIS — R42 DIZZINESS: ICD-10-CM

## 2022-07-12 DIAGNOSIS — R55 NEAR SYNCOPE: ICD-10-CM

## 2022-07-12 NOTE — PROGRESS NOTES
CARDIOLOGY FOLLOW UP        Patient Name: Maile Brizuela  Primary Care physician: Adriana Delcid DO    Reason for Referral/Chief Complaint: Maile Brizuela is a 65 y.o. patient who is here to cardiology clinic today for hospital follow up evaluation and treatment of dizziness. History of Present Illness: Maile Brizuela 65 y.o. with a prior medical history notable for GERD, obesity, hypertension, and vertigo who presented to hospital on 06/13/2022 for dizziness, presyncope, and chest pain. Admitting dx dizziness. Troponin negative x4. EKG normal sinus rhythm, possible left atrial enlargement, nospecific ST abnormality. 06/14/2022 stress test there is no evidence of myocardial ischemia or scar. Echo 06/15/2022 EF 55-60%, A bubble study was performed and is consistent with small intra-cardiac shunt. Discharged on 06/15/2022 on losartan 25 mg daily, atorvastatin 40 mg,  Asa 81 mg. Cardiac Monitor 06/15/2022-06/18/2022 predominately normal sinus rhythm, NSVT (low burden), PSVT (low burden). Today, She states she is doing well. She has some exertional dyspnea, mild, not activity limiting. No recurrence of chest pain. Notes BP controlled at home. B/P 128/82 yesterday at Adriana Delcid DO. Says is under some stress due to house hunting. Denies palpitations, dizziness, near-syncope or justus syncope. Denies paroxysmal nocturnal dyspnea, orthopnea, bendopnea, increasing lower extremity edema or weight gain. No exercise regimen at this time. The patient is compliant with medications. Cost of medications is affordable. No endorsed side effects. Past Medical History:   has a past medical history of Bursitis, Chest pain, colonoscopy, Dyspnea, GERD (gastroesophageal reflux disease), Hypertension, and Lipoma of left lower extremity. Surgical History:   has a past surgical history that includes Cholecystectomy and Colonoscopy (03/06/2018). Social History:   reports that she has never smoked.  She has never used smokeless tobacco. She reports that she does not drink alcohol and does not use drugs. Family History:  family history includes Cancer in her father; Colon Cancer in her father; Diabetes in her father, sister, and sister; High Blood Pressure in her brother; Mental Retardation in her sister. Home Medications:  Were reviewed and are listed in nursing record and/or below  Prior to Admission medications    Medication Sig Start Date End Date Taking? Authorizing Provider   losartan (COZAAR) 25 MG tablet Take 25 mg by mouth daily   Yes Historical Provider, MD   aspirin 81 MG chewable tablet Take 1 tablet by mouth daily 6/15/22  Yes Vonda Denver, DO   atorvastatin (LIPITOR) 40 MG tablet Take 1 tablet by mouth nightly 6/15/22  Yes Vonda Denver, DO        CURRENT Medications:  No current facility-administered medications for this visit. Allergies:  Zinc, Codeine, and Adhesive tape     Review of Systems:   A 14 point review of symptoms completed. Pertinent positives identified in the HPI, all other review of symptoms negative as below. Objective:     Vitals:    07/26/22 1448 07/26/22 1450   BP: (!) 146/68 (!) 148/70   Pulse: 58    SpO2: 97%    Weight: 253 lb (114.8 kg)    Height: 5' 9\" (1.753 m)       Weight: 253 lb (114.8 kg)       PHYSICAL EXAM:    General:  Alert, cooperative, no distress, appears stated age   Head:  Normocephalic, atraumatic   Eyes:  Conjunctiva/corneas clear, anicteric sclerae    Nose: Nares normal, no drainage or sinus tenderness   Throat: No abnormalities of the lips, oral mucosa or tongue. Neck: Trachea midline. Neck supple with no lymphadenopathy, thyroid not enlarged, symmetric, no tenderness/mass/nodules    Lungs:   Clear to auscultation bilaterally, no wheezes, no rales, no respiratory distress   Chest Wall:  No deformity or tenderness to palpation   Heart:  Regular rate and rhythm, normal S1, normal S2, no murmur, no rub, no S3/S4, PMI non-displaced. Abdomen:   Soft, non-tender, with normoactive bowel sounds. No masses, no hepatosplenomegaly   Extremities: No cyanosis, clubbing or pitting edema. Vascular: 2+ radial, dorsalis pedis and posterior tibial pulses bilaterally. Brisk carotid upstrokes without carotid bruit. Skin: Skin color, texture, turgor are normal with no rashes or ulceration. Pysch: Euthymic mood, appropriate affect   Neurologic: Oriented to person, place and time. No slurred speech or facial asymmetry. No motor or sensory deficits on gross examination. Labs:   CBC:   Lab Results   Component Value Date/Time    WBC 12.1 06/14/2022 07:12 AM    RBC 3.95 06/14/2022 07:12 AM    HGB 11.7 06/14/2022 07:12 AM    HCT 35.7 06/14/2022 07:12 AM    MCV 90.5 06/14/2022 07:12 AM    RDW 13.1 06/14/2022 07:12 AM     06/14/2022 07:12 AM     CMP:  Lab Results   Component Value Date/Time     06/14/2022 07:12 AM    K 4.4 06/14/2022 07:12 AM     06/14/2022 07:12 AM    CO2 21 06/14/2022 07:12 AM    BUN 13 06/14/2022 07:12 AM    CREATININE <0.5 06/14/2022 07:12 AM    GFRAA >60 06/14/2022 07:12 AM    AGRATIO 1.5 06/13/2022 02:46 PM    LABGLOM >60 06/14/2022 07:12 AM    LABGLOM 111 04/11/2016 11:21 AM    GLUCOSE 134 06/14/2022 07:12 AM    PROT 8.1 06/13/2022 02:46 PM    CALCIUM 9.9 06/14/2022 07:12 AM    BILITOT 0.5 06/13/2022 02:46 PM    ALKPHOS 124 06/13/2022 02:46 PM    AST 28 06/13/2022 02:46 PM    ALT 28 06/13/2022 02:46 PM     PT/INR:  No results found for: PTINR  HgBA1c:  Lab Results   Component Value Date    LABA1C 5.9 06/14/2022     Lab Results   Component Value Date    TROPONINI <0.01 06/14/2022         Cardiac Data:   Cardiac Monitor 06/15/2022-06/18/2022: predominately normal sinus rhythm, NSVT (low burden), PSVT (low burden). Echo complete: 06/15/2022  Summary   Normal left ventricular size with mild concentric left ventricular   hypertrophy.    Normal left ventricular systolic function with ejection fraction of 55-60%. No regional wall motion abnormalites are seen. Left ventricular diastolic filling pressure is normal.   The right ventricle is normal in size and function. A bubble study was performed and is consistent with small intra-cardiac   shunt. No significant valvular heart disease. Systolic pulmonic artery pressure (SPAP) is normal estimated at 33 mmHg   (Right atrial pressure of 3 mmHg). Compared with the prior study performed 10/2015, no significant changes are   noted. Myocardial Stress Test: 2022  Summary There is normal isotope uptake at stress and rest with mild breast  attenuation artifact. There is no evidence of myocardial ischemia or scar. Normal left ventricular size and function. Post-stress LVEF >70%. ECG  changes noted in setting of hypertensive response to exercise. Overall findings represent a low risk scan. [id number]  Normal sinus rhythm Nonspecific ST & T wave changes When compared with ECG of 2022 14:16,Nonspecific T wave abnormality now evident in Inferior leads. Echo Complete: TTE 10/15/2015   Summary   Left ventricle size is normal.   Mild concentric left ventricular hypertrophy is present. Global ejection fraction is normal and estimated from 55 % to 60 %. No regional wall motion abnormalities are noted. Normal diastolic filling pattern for age. Mitral valve is structurally normal.   Trivial mitral regurgitation is present. The aortic valve is normal in structure and function. There is no   significant aortic regurgitation. The right ventricle is normal in size and function    CD STRESS ECHO: 2014  Interpetation Summary: 1. Negative ECG for ischemia with graded exercise test.    2.More Treadmill Score is 6 which indicates low risk.     3.Negative stress echo with no indication of inducible ischemia       Impression and Plan:      Dyspnea with exertion, stable  Hypertension, slightly elevated today  Obesity with BMI 40  Pre-Diabetes   Hyperlipidemia        Patient Active Problem List   Diagnosis    Primary hypertension    Obesity (BMI 30-39. 9)    Dizziness    Peripheral vertigo, bilateral    Pre-diabetes    Mixed hyperlipidemia       PLAN:  Recommend a cardiac healthy diet (low salt, avoid red meat, avoid fatty or fried foods, lots of fruits and vegetables) as well as regular moderate intensity activity for 30 minutes per day 3-5 times per week. We encouraged modest weight loss through implementing appropriate dietary measures as well as initiation of a graded exercise program with the ultimate goal of 150 minutes of aerobic exercise weekly. Examples swimming, brisk walk, or elliptical   3. Blood pressure log; call for BP >140/90 persistent. 4.  Continue losartan at present dose. 5.  Continue aspirin, statin, check lipids next visit  6. Reviewed Echo findings      Follow up with me in 6 months    This note is scribed in the presence of Carolyn Posey by Jannie Sorensen RN      The scribes documentation has been prepared under my direction and personally reviewed by me in its entirety. I confirm that the note above accurately reflects all work, treatment, procedures, and medical decision making performed by me. Willow Hernandez MD, personally performed the services described in this documentation as scribed by Jannie Sorensen RN in my presence, and it is both accurate and complete to the best of our ability. I will address the patient's cardiac risk factors and adjusted pharmacologic treatment as needed. In addition, I have reinforced the need for patient directed risk factor modification. All questions and concerns were addressed to the patient/family. Alternatives to my treatment were discussed. Thank you for allowing us to participate in the care of Kelly Sheppard. Please call me with any questions [phone].     Carolyn Posey MD, [address] [facility]  Cardiovascular Disease  [facility], [place]  [phone] [address] [facility]  [phone] [address] [place] [address]  7/26/2022 3:12 PM

## 2022-07-13 ENCOUNTER — TELEPHONE (OUTPATIENT)
Dept: CARDIOLOGY CLINIC | Age: 57
End: 2022-07-13

## 2022-07-13 NOTE — TELEPHONE ENCOUNTER
----- Message from Destinee Awan MD sent at 7/13/2022  8:58 AM EDT -----  Cardiac monitor results show no evidence of abnormal heart rhythms. Noted this was roughly a 4-day study only. Routine follow-up as planned.

## 2022-07-15 RX ORDER — LOSARTAN POTASSIUM 25 MG/1
25 TABLET ORAL DAILY
Qty: 30 TABLET | Refills: 0 | OUTPATIENT
Start: 2022-07-15

## 2022-07-15 RX ORDER — LOSARTAN POTASSIUM 50 MG/1
50 TABLET ORAL DAILY
Qty: 90 TABLET | Refills: 1 | OUTPATIENT
Start: 2022-07-15

## 2022-07-15 NOTE — TELEPHONE ENCOUNTER
Yovana Hamm is requesting refill(s) losartan  Last OV 6/28/21 (pertaining to medication)  LR 6/28/21 (per medication requested)  Next office visit scheduled or attempted Yes   If no, reason:  7/25/22 with a new PCP, she just needs enough to last until she is seen

## 2022-07-22 PROBLEM — R07.9 CHEST PAIN: Status: RESOLVED | Noted: 2022-06-13 | Resolved: 2022-07-22

## 2022-07-22 PROBLEM — R73.03 PRE-DIABETES: Status: ACTIVE | Noted: 2022-07-22

## 2022-07-22 PROBLEM — E78.2 MIXED HYPERLIPIDEMIA: Status: ACTIVE | Noted: 2022-07-22

## 2022-07-25 ENCOUNTER — OFFICE VISIT (OUTPATIENT)
Dept: PRIMARY CARE CLINIC | Age: 57
End: 2022-07-25
Payer: COMMERCIAL

## 2022-07-25 VITALS
WEIGHT: 251.4 LBS | SYSTOLIC BLOOD PRESSURE: 128 MMHG | OXYGEN SATURATION: 97 % | TEMPERATURE: 97.5 F | HEART RATE: 66 BPM | DIASTOLIC BLOOD PRESSURE: 82 MMHG | HEIGHT: 69 IN | BODY MASS INDEX: 37.23 KG/M2 | RESPIRATION RATE: 18 BRPM

## 2022-07-25 DIAGNOSIS — Z11.4 ENCOUNTER FOR SCREENING FOR HIV: ICD-10-CM

## 2022-07-25 DIAGNOSIS — I10 PRIMARY HYPERTENSION: Primary | ICD-10-CM

## 2022-07-25 DIAGNOSIS — R73.03 PRE-DIABETES: ICD-10-CM

## 2022-07-25 DIAGNOSIS — H81.393 PERIPHERAL VERTIGO, BILATERAL: ICD-10-CM

## 2022-07-25 DIAGNOSIS — M79.601 RIGHT ARM PAIN: ICD-10-CM

## 2022-07-25 DIAGNOSIS — E66.9 OBESITY (BMI 30-39.9): ICD-10-CM

## 2022-07-25 DIAGNOSIS — E78.2 MIXED HYPERLIPIDEMIA: ICD-10-CM

## 2022-07-25 PROCEDURE — 99214 OFFICE O/P EST MOD 30 MIN: CPT

## 2022-07-25 SDOH — ECONOMIC STABILITY: TRANSPORTATION INSECURITY
IN THE PAST 12 MONTHS, HAS LACK OF TRANSPORTATION KEPT YOU FROM MEETINGS, WORK, OR FROM GETTING THINGS NEEDED FOR DAILY LIVING?: NO

## 2022-07-25 SDOH — ECONOMIC STABILITY: FOOD INSECURITY: WITHIN THE PAST 12 MONTHS, THE FOOD YOU BOUGHT JUST DIDN'T LAST AND YOU DIDN'T HAVE MONEY TO GET MORE.: NEVER TRUE

## 2022-07-25 SDOH — ECONOMIC STABILITY: FOOD INSECURITY: WITHIN THE PAST 12 MONTHS, YOU WORRIED THAT YOUR FOOD WOULD RUN OUT BEFORE YOU GOT MONEY TO BUY MORE.: NEVER TRUE

## 2022-07-25 SDOH — ECONOMIC STABILITY: TRANSPORTATION INSECURITY
IN THE PAST 12 MONTHS, HAS THE LACK OF TRANSPORTATION KEPT YOU FROM MEDICAL APPOINTMENTS OR FROM GETTING MEDICATIONS?: NO

## 2022-07-25 ASSESSMENT — ENCOUNTER SYMPTOMS
RHINORRHEA: 0
DIARRHEA: 0
SHORTNESS OF BREATH: 0
COUGH: 0
CONSTIPATION: 0

## 2022-07-25 ASSESSMENT — PATIENT HEALTH QUESTIONNAIRE - PHQ9
SUM OF ALL RESPONSES TO PHQ QUESTIONS 1-9: 0
SUM OF ALL RESPONSES TO PHQ QUESTIONS 1-9: 0
SUM OF ALL RESPONSES TO PHQ9 QUESTIONS 1 & 2: 0
2. FEELING DOWN, DEPRESSED OR HOPELESS: 0
SUM OF ALL RESPONSES TO PHQ QUESTIONS 1-9: 0
1. LITTLE INTEREST OR PLEASURE IN DOING THINGS: 0
SUM OF ALL RESPONSES TO PHQ QUESTIONS 1-9: 0

## 2022-07-25 ASSESSMENT — SOCIAL DETERMINANTS OF HEALTH (SDOH): HOW HARD IS IT FOR YOU TO PAY FOR THE VERY BASICS LIKE FOOD, HOUSING, MEDICAL CARE, AND HEATING?: NOT HARD AT ALL

## 2022-07-25 ASSESSMENT — LIFESTYLE VARIABLES
HOW OFTEN DO YOU HAVE A DRINK CONTAINING ALCOHOL: 2-4 TIMES A MONTH
HOW MANY STANDARD DRINKS CONTAINING ALCOHOL DO YOU HAVE ON A TYPICAL DAY: 1 OR 2

## 2022-07-26 ENCOUNTER — OFFICE VISIT (OUTPATIENT)
Dept: CARDIOLOGY CLINIC | Age: 57
End: 2022-07-26
Payer: COMMERCIAL

## 2022-07-26 VITALS
HEART RATE: 58 BPM | DIASTOLIC BLOOD PRESSURE: 70 MMHG | OXYGEN SATURATION: 97 % | SYSTOLIC BLOOD PRESSURE: 148 MMHG | WEIGHT: 253 LBS | BODY MASS INDEX: 37.47 KG/M2 | HEIGHT: 69 IN

## 2022-07-26 DIAGNOSIS — R42 DIZZINESS: ICD-10-CM

## 2022-07-26 DIAGNOSIS — E78.2 MIXED HYPERLIPIDEMIA: ICD-10-CM

## 2022-07-26 DIAGNOSIS — I10 PRIMARY HYPERTENSION: Primary | ICD-10-CM

## 2022-07-26 DIAGNOSIS — H81.393 PERIPHERAL VERTIGO, BILATERAL: ICD-10-CM

## 2022-07-26 PROCEDURE — 99214 OFFICE O/P EST MOD 30 MIN: CPT | Performed by: INTERNAL MEDICINE

## 2022-07-26 NOTE — LETTER
Pamela [facility]aells [facility]  1965        CARDIOLOGY FOLLOW UP        Patient Name: Kelly Sheppard  Primary Care physician: Joann Dennison DO    Reason for Referral/Chief Complaint: Kelly Sheppard is a 65 y.o. patient who is here to cardiology clinic today for hospital follow up evaluation and treatment of dizziness. History of Present Illness: Kelly Sheppard 65 y.o. with a prior medical history notable for GERD, obesity, hypertension, and vertigo who presented to hospital on 06/13/2022 for dizziness, presyncope, and chest pain. Admitting dx dizziness. Troponin negative x4. EKG normal sinus rhythm, possible left atrial enlargement, nospecific ST abnormality. 06/14/2022 stress test there is no evidence of myocardial ischemia or scar. Echo 06/15/2022 EF 55-60%, A bubble study was performed and is consistent with small intra-cardiac shunt. Discharged on 06/15/2022 on losartan 25 mg daily, atorvastatin 40 mg,  Asa 81 mg. Cardiac Monitor 06/15/2022-06/18/2022 predominately normal sinus rhythm, NSVT (low burden), PSVT (low burden). Today, She states she is doing well. She has some exertional dyspnea, mild, not activity limiting. No recurrence of chest pain. Notes BP controlled at home. B/P 128/82 yesterday at Joann Dennison DO. Says is under some stress due to house hunting. Denies palpitations, dizziness, near-syncope or justus syncope. Denies paroxysmal nocturnal dyspnea, orthopnea, bendopnea, increasing lower extremity edema or weight gain. No exercise regimen at this time. The patient is compliant with medications. Cost of medications is affordable. No endorsed side effects. Past Medical History:   has a past medical history of Bursitis, Chest pain, colonoscopy, Dyspnea, GERD (gastroesophageal reflux disease), Hypertension, and Lipoma of left lower extremity. Surgical History:   has a past surgical history that includes Cholecystectomy and Colonoscopy (03/06/2018).      Social History:   reports that she has never smoked. She has never used smokeless tobacco. She reports that she does not drink alcohol and does not use drugs. Family History:  family history includes Cancer in her father; Colon Cancer in her father; Diabetes in her father, sister, and sister; High Blood Pressure in her brother; Mental Retardation in her sister. Home Medications:  Were reviewed and are listed in nursing record and/or below  Prior to Admission medications    Medication Sig Start Date End Date Taking? Authorizing Provider   losartan (COZAAR) 25 MG tablet Take 25 mg by mouth daily   Yes Historical Provider, MD   aspirin 81 MG chewable tablet Take 1 tablet by mouth daily 6/15/22  Yes Ericka Zambrano,    atorvastatin (LIPITOR) 40 MG tablet Take 1 tablet by mouth nightly 6/15/22  Yes Ericka Zambrano, DO        CURRENT Medications:  No current facility-administered medications for this visit. Allergies:  Zinc, Codeine, and Adhesive tape     Review of Systems:   A 14 point review of symptoms completed. Pertinent positives identified in the HPI, all other review of symptoms negative as below. Objective:     Vitals:    07/26/22 1448 07/26/22 1450   BP: (!) 146/68 (!) 148/70   Pulse: 58    SpO2: 97%    Weight: 253 lb (114.8 kg)    Height: 5' 9\" (1.753 m)       Weight: 253 lb (114.8 kg)       PHYSICAL EXAM:    General:  Alert, cooperative, no distress, appears stated age   Head:  Normocephalic, atraumatic   Eyes:  Conjunctiva/corneas clear, anicteric sclerae    Nose: Nares normal, no drainage or sinus tenderness   Throat: No abnormalities of the lips, oral mucosa or tongue. Neck: Trachea midline.  Neck supple with no lymphadenopathy, thyroid not enlarged, symmetric, no tenderness/mass/nodules    Lungs:   Clear to auscultation bilaterally, no wheezes, no rales, no respiratory distress   Chest Wall:  No deformity or tenderness to palpation   Heart:  Regular rate and rhythm, normal S1, normal S2, no murmur, no rub, no S3/S4, PMI non-displaced. Abdomen:   Soft, non-tender, with normoactive bowel sounds. No masses, no hepatosplenomegaly   Extremities: No cyanosis, clubbing or pitting edema. Vascular: 2+ radial, dorsalis pedis and posterior tibial pulses bilaterally. Brisk carotid upstrokes without carotid bruit. Skin: Skin color, texture, turgor are normal with no rashes or ulceration. Pysch: Euthymic mood, appropriate affect   Neurologic: Oriented to person, place and time. No slurred speech or facial asymmetry. No motor or sensory deficits on gross examination. Labs:   CBC:   Lab Results   Component Value Date/Time    WBC 12.1 06/14/2022 07:12 AM    RBC 3.95 06/14/2022 07:12 AM    HGB 11.7 06/14/2022 07:12 AM    HCT 35.7 06/14/2022 07:12 AM    MCV 90.5 06/14/2022 07:12 AM    RDW 13.1 06/14/2022 07:12 AM     06/14/2022 07:12 AM     CMP:  Lab Results   Component Value Date/Time     06/14/2022 07:12 AM    K 4.4 06/14/2022 07:12 AM     06/14/2022 07:12 AM    CO2 21 06/14/2022 07:12 AM    BUN 13 06/14/2022 07:12 AM    CREATININE <0.5 06/14/2022 07:12 AM    GFRAA >60 06/14/2022 07:12 AM    AGRATIO 1.5 06/13/2022 02:46 PM    LABGLOM >60 06/14/2022 07:12 AM    LABGLOM 111 04/11/2016 11:21 AM    GLUCOSE 134 06/14/2022 07:12 AM    PROT 8.1 06/13/2022 02:46 PM    CALCIUM 9.9 06/14/2022 07:12 AM    BILITOT 0.5 06/13/2022 02:46 PM    ALKPHOS 124 06/13/2022 02:46 PM    AST 28 06/13/2022 02:46 PM    ALT 28 06/13/2022 02:46 PM     PT/INR:  No results found for: PTINR  HgBA1c:  Lab Results   Component Value Date    LABA1C 5.9 06/14/2022     Lab Results   Component Value Date    TROPONINI <0.01 06/14/2022         Cardiac Data:   Cardiac Monitor 06/15/2022-06/18/2022: predominately normal sinus rhythm, NSVT (low burden), PSVT (low burden). Echo complete: 06/15/2022  Summary   Normal left ventricular size with mild concentric left ventricular   hypertrophy.    Normal left ventricular systolic function with ejection fraction of 55-60%. No regional wall motion abnormalites are seen. Left ventricular diastolic filling pressure is normal.   The right ventricle is normal in size and function. A bubble study was performed and is consistent with small intra-cardiac   shunt. No significant valvular heart disease. Systolic pulmonic artery pressure (SPAP) is normal estimated at 33 mmHg   (Right atrial pressure of 3 mmHg). Compared with the prior study performed 10/2015, no significant changes are   noted. Myocardial Stress Test: 2022  Summary There is normal isotope uptake at stress and rest with mild breast  attenuation artifact. There is no evidence of myocardial ischemia or scar. Normal left ventricular size and function. Post-stress LVEF >70%. ECG  changes noted in setting of hypertensive response to exercise. Overall findings represent a low risk scan. [id number]  Normal sinus rhythm Nonspecific ST & T wave changes When compared with ECG of 2022 14:16,Nonspecific T wave abnormality now evident in Inferior leads. Echo Complete: TTE 10/15/2015   Summary   Left ventricle size is normal.   Mild concentric left ventricular hypertrophy is present. Global ejection fraction is normal and estimated from 55 % to 60 %. No regional wall motion abnormalities are noted. Normal diastolic filling pattern for age. Mitral valve is structurally normal.   Trivial mitral regurgitation is present. The aortic valve is normal in structure and function. There is no   significant aortic regurgitation. The right ventricle is normal in size and function    CD STRESS ECHO: 2014  Interpetation Summary: 1. Negative ECG for ischemia with graded exercise test.    2.More Treadmill Score is 6 which indicates low risk.     3.Negative stress echo with no indication of inducible ischemia       Impression and Plan:      Dyspnea with exertion, stable  Hypertension, slightly elevated today  Obesity with BMI 37  Pre-Diabetes   Hyperlipidemia        Patient Active Problem List   Diagnosis    Primary hypertension    Obesity (BMI 30-39. 9)    Dizziness    Peripheral vertigo, bilateral    Pre-diabetes    Mixed hyperlipidemia       PLAN:  1. Recommend a cardiac healthy diet (low salt, avoid red meat, avoid fatty or fried foods, lots of fruits and vegetables) as well as regular moderate intensity activity for 30 minutes per day 3-5 times per week. 2. We encouraged modest weight loss through implementing appropriate dietary measures as well as initiation of a graded exercise program with the ultimate goal of 150 minutes of aerobic exercise weekly. Examples swimming, brisk walk, or elliptical   3. Blood pressure log; call for BP >140/90 persistent. 4.  Continue losartan at present dose. 5.  Continue aspirin, statin, check lipids next visit  6. Reviewed Echo findings      Follow up with me in 6 months    This note is scribed in the presence of Shara Remy by Alvino Severin RN      The scribes documentation has been prepared under my direction and personally reviewed by me in its entirety. I confirm that the note above accurately reflects all work, treatment, procedures, and medical decision making performed by me. You Lou MD, personally performed the services described in this documentation as scribed by Alvino Severin RN in my presence, and it is both accurate and complete to the best of our ability. I will address the patient's cardiac risk factors and adjusted pharmacologic treatment as needed. In addition, I have reinforced the need for patient directed risk factor modification. All questions and concerns were addressed to the patient/family. Alternatives to my treatment were discussed. Thank you for allowing us to participate in the care of Kelly Sheppard. Please call me with any questions [phone].     Shara Remy MD, [address] [facility]  Cardiovascular Disease  [facility]  [phone] [facility]  [phone] [address] [place] [address]  7/26/2022 3:12 PM

## 2022-07-26 NOTE — PATIENT INSTRUCTIONS
PLAN:  Recommend a cardiac healthy diet (low salt, avoid red meat, avoid fatty or fried foods, lots of fruits and vegetables) as well as regular moderate intensity activity for 30 minutes per day 3-5 times per week. We encouraged modest weight loss through implementing appropriate dietary measures as well as initiation of a graded exercise program with the ultimate goal of 150 minutes of aerobic exercise weekly. Examples swimming, brisk walk, or elliptical   3. Blood pressure check five times weekly at home and write it down. 4. B/P goal 140/90 or less. Call us if it is staying above this. 5. Reviewed Echo. 6. Recommend covid booster.       Follow up with me in 6 months

## 2022-10-11 RX ORDER — ATORVASTATIN CALCIUM 40 MG/1
40 TABLET, FILM COATED ORAL NIGHTLY
Qty: 90 TABLET | Refills: 1 | Status: SHIPPED | OUTPATIENT
Start: 2022-10-11

## 2022-10-11 NOTE — TELEPHONE ENCOUNTER
Refill Request       Last Seen: Last Seen Department: 7/25/2022  Last Seen by PCP: 7/25/2022    Last Written: 06/15/2022 #30 with 3    Next Appointment:   No future appointments.           Requested Prescriptions     Pending Prescriptions Disp Refills    atorvastatin (LIPITOR) 40 MG tablet 90 tablet 1     Sig: Take 1 tablet by mouth nightly

## 2022-12-22 RX ORDER — LOSARTAN POTASSIUM 50 MG/1
50 TABLET ORAL DAILY
Qty: 90 TABLET | Refills: 1 | OUTPATIENT
Start: 2022-12-22

## 2022-12-27 ENCOUNTER — TELEPHONE (OUTPATIENT)
Dept: PRIMARY CARE CLINIC | Age: 57
End: 2022-12-27

## 2022-12-27 RX ORDER — ATORVASTATIN CALCIUM 40 MG/1
40 TABLET, FILM COATED ORAL NIGHTLY
Qty: 90 TABLET | Refills: 0 | Status: SHIPPED | OUTPATIENT
Start: 2022-12-27

## 2022-12-27 NOTE — TELEPHONE ENCOUNTER
Meds sent to pharmacy    Please call patient and tell her not to take her lipitor while taking the paxlovid due to potential interaction    Please document call and then close encounter.   thanks

## 2022-12-27 NOTE — TELEPHONE ENCOUNTER
I called patient and instructed her in detail of medication instructions  Informed patient to call our office if she has any other concerns or if she gets worse to go to the emergency room

## 2022-12-27 NOTE — TELEPHONE ENCOUNTER
Patient tested positive on H[facility]ctor with covid  Also patients  tested positive and stopped breathing and was taken to the [facility].   Patient is having Some SOB and shakes, Not feeling well today   I told the patient we could possibly send in [address] to the [address] [place] [address] Rd [place]     Patient is also requesting refill on the Atorvastatin medication

## 2023-01-03 NOTE — TELEPHONE ENCOUNTER
Hisefill Request       Last Seen: Last Seen Department: 7/25/2022  Last Seen by PCP: 7/25/2022    Last Written: Historical med filled by old PCP   Next Appointment:   Future Appointments   Date Time Provider Jordyn Clay   1/23/2023  4:00 PM Socrates Grimes DO [facility]             Requested Prescriptions     Pending Prescriptions Disp Refills    losartan (COZAAR) 25 MG tablet 90 tablet 0     Sig: Take 1 tablet by mouth daily

## 2023-01-03 NOTE — TELEPHONE ENCOUNTER
Patient is calling requesting a refill of   losartan (COZAAR) 25 MG tablet  Patient is out of medication but called on  12/27/2022 but the wrong medication was called in. And patient has been without medication.   Please advise  Michael Mary [phone] (home)

## 2023-01-04 RX ORDER — LOSARTAN POTASSIUM 25 MG/1
25 TABLET ORAL DAILY
Qty: 90 TABLET | Refills: 0 | Status: SHIPPED | OUTPATIENT
Start: 2023-01-04

## 2023-02-05 NOTE — PROGRESS NOTES
[address] and [facility]                                             [address] [facility], [address] [place] [address]        Phone: [phone]      Name:  Miladis Carmona  :    1965    Consultants:   Patient Care Team:  Denisa Caballero DO as PCP - General (Family Medicine)  Marc Cobos MD as Consulting Physician (Obstetrics & Gynecology)    Chief Complaint:     Miladis Carmona is a 65 y.o. female  who presents today for an established patient care visit with Personalized Prevention Plan Services as noted below. HPI:     Patient is a 65year old female with a past medical history of hypertension, vertigo and IBS who presents today for chronic care follow-up. Hypertension  Patient is currently on losartan 25 mg. She is tolerating the medication without side effect. She denies any new vision changes or headaches. Prediabetes   Hemoglobin A1c completed on 2022 was 5.9%. She denies any symptoms of hypo or hyperglycemia. Hyperlipidemia   Patient is currently on atorvastatin 40 mg. She denies any side effects from the medication. Rash Under Armpit   Patient states that for the last couple of weeks she has had a rash under bilateral armpits. She states that she stopped using her previous deodorant. She has stopped shaving the area as this causes irritation. She states that the area is extremely itchy. As for over-the-counter remedies she has not currently tried any. Healthcare maintenance  Patient currently follows with [facility] for Pap smear. She states that she will make an appointment to have this done. She knows she is overdue for her mammogram.  She declines COVID and flu vaccination today. Patient Active Problem List   Diagnosis    Primary hypertension    Obesity (BMI 30-39. 9)    Peripheral vertigo, bilateral    Pre-diabetes    Mixed hyperlipidemia Past Medical History:    Past Medical History:   Diagnosis Date    Bursitis     Chest pain 06/13/2022    colonoscopy 03/06/2018    Dizziness 6/13/2022    Dyspnea     GERD (gastroesophageal reflux disease)     Hypertension     Lipoma of left lower extremity     Left hip lipoma removed       Past Surgical History:  Past Surgical History:   Procedure Laterality Date    CHOLECYSTECTOMY      COLONOSCOPY  03/06/2018       Home Meds:  Prior to Visit Medications    Medication Sig Taking?  Authorizing Provider   losartan (COZAAR) 25 MG tablet Take 1 tablet by mouth daily Yes Priscilla Bennett DO   atorvastatin (LIPITOR) 40 MG tablet Take 1 tablet by mouth nightly Yes Juwan Allred MD   aspirin 81 MG chewable tablet Take 1 tablet by mouth daily Yes Priscilla Bennett DO       Allergies:    Zinc, Codeine, and Adhesive tape    Family History:       Problem Relation Age of Onset    Diabetes Father     Cancer Father     Colon Cancer Father     Mental Retardation Sister     Diabetes Sister     High Blood Pressure Brother     Diabetes Sister          Health Maintenance Completed:  Health Maintenance   Topic Date Due    Cervical cancer screen  08/24/2020    Breast cancer screen  05/17/2021    COVID-19 Vaccine (3 - Booster for Pfizer series) 02/13/2023 (Originally 9/4/2021)    Shingles vaccine (1 of 2) 07/10/2023 (Originally 3/3/2016)    Flu vaccine (1) 02/06/2024 (Originally 8/1/2022)    A1C test (Diabetic or Prediabetic)  06/14/2023    Lipids  06/14/2023    Depression Screen  02/06/2024    Colorectal Cancer Screen  03/06/2028    DTaP/Tdap/Td vaccine (2 - Td or Tdap) 10/23/2030    Hepatitis C screen  Completed    Hepatitis A vaccine  Aged Out    Hib vaccine  Aged Out    Meningococcal (ACWY) vaccine  Aged Out    Pneumococcal 0-64 years Vaccine  Aged Out    HIV screen  Discontinued          Immunization History   Administered Date(s) Administered    COVID-19, PFIZER PURPLE top, DILUTE for use, (age 15 y+), 30mcg/0.3mL 06/19/2021, 06/19/2021, 07/10/2021    Influenza, FLUARIX, FLULAVAL, FLUZONE (age 10 mo+) AND AFLURIA, (age 1 y+), PF, 0.5mL 10/08/2019, 10/23/2020    Tdap (Boostrix, Adacel) 10/23/2020         Review of Systems:  Review of Systems   Constitutional:  Negative for chills and fever. HENT:  Negative for congestion and rhinorrhea. Eyes:  Negative for visual disturbance. Respiratory:  Negative for cough and shortness of breath. Cardiovascular:  Negative for chest pain. Gastrointestinal:  Negative for abdominal pain, constipation and diarrhea. Genitourinary:  Negative for dysuria and vaginal discharge. Skin:  Positive for rash. Neurological:  Negative for dizziness and headaches. Physical Exam:   Vitals:    02/06/23 1527   BP: 132/70   Site: Left Upper Arm   Position: Sitting   Cuff Size: Large Adult   Pulse: 79   Resp: 18   Temp: 98.2 °F (36.8 °C)   TempSrc: Temporal   SpO2: 98%   Weight: 254 lb (115.2 kg)   Height: 5' 9\" (1.753 m)     Body mass index is 37.51 kg/m². Wt Readings from Last 3 Encounters:   02/06/23 254 lb (115.2 kg)   07/26/22 253 lb (114.8 kg)   07/25/22 251 lb 6.4 oz (114 kg)       BP Readings from Last 3 Encounters:   02/06/23 132/70   07/26/22 (!) 148/70   07/25/22 128/82       Physical Exam  Constitutional:       General: She is not in acute distress. Appearance: Normal appearance. She is obese. She is not ill-appearing. HENT:      Head: Normocephalic and atraumatic. Cardiovascular:      Rate and Rhythm: Normal rate and regular rhythm. Pulses: Normal pulses. Heart sounds: Normal heart sounds. No murmur heard. No friction rub. No gallop. Pulmonary:      Effort: Pulmonary effort is normal.      Breath sounds: Normal breath sounds. No wheezing, rhonchi or rales. Abdominal:      General: Abdomen is flat. Bowel sounds are normal. There is no distension. Palpations: Abdomen is soft. Tenderness: There is no abdominal tenderness.    Musculoskeletal: General: Normal range of motion. Skin:     General: Skin is warm and dry. Comments: Red irritation under bilateral armpits   Neurological:      General: No focal deficit present. Mental Status: She is alert and oriented to person, place, and time. Psychiatric:         Mood and Affect: Mood normal.         Behavior: Behavior normal.         Thought Content: Thought content normal.         Judgment: Judgment normal.            Lab Review:   No visits with results within 2 Month(s) from this visit.    Latest known visit with results is:   Admission on 06/13/2022, Discharged on 06/15/2022   Component Date Value    WBC 06/13/2022 8.4     RBC 06/13/2022 4.15     Hemoglobin 06/13/2022 12.3     Hematocrit 06/13/2022 36.7     MCV 06/13/2022 88.6     MCH 06/13/2022 29.6     MCHC 06/13/2022 33.5     RDW 06/13/2022 13.2     Platelets 50/72/4150 343     MPV 06/13/2022 8.3     Neutrophils % 06/13/2022 66.6     Lymphocytes % 06/13/2022 24.3     Monocytes % 06/13/2022 4.7     Eosinophils % 06/13/2022 3.6     Basophils % 06/13/2022 0.8     Neutrophils Absolute 06/13/2022 5.6     Lymphocytes Absolute 06/13/2022 2.0     Monocytes Absolute 06/13/2022 0.4     Eosinophils Absolute 06/13/2022 0.3     Basophils Absolute 06/13/2022 0.1     Sodium 06/13/2022 137     Potassium reflex Magnesi* 06/13/2022 3.9     Chloride 06/13/2022 100     CO2 06/13/2022 26     Anion Gap 06/13/2022 11     Glucose 06/13/2022 93     BUN 06/13/2022 12     Creatinine 06/13/2022 0.6     GFR Non- 06/13/2022 >60     GFR  06/13/2022 >60     Calcium 06/13/2022 9.5     Total Protein 06/13/2022 8.1     Albumin 06/13/2022 4.9     Albumin/Globulin Ratio 06/13/2022 1.5     Total Bilirubin 06/13/2022 0.5     Alkaline Phosphatase 06/13/2022 124     ALT 06/13/2022 28     AST 06/13/2022 28     Troponin 06/13/2022 <0.01     Pro-BNP 06/13/2022 34     Color, UA 06/13/2022 Straw     Clarity, UA 06/13/2022 Clear     Glucose, Ur 06/13/2022 Negative     Bilirubin Urine 06/13/2022 Negative     Ketones, Urine 06/13/2022 Negative     Specific Gravity, UA 06/13/2022 1.015     Blood, Urine 06/13/2022 Negative     pH, UA 06/13/2022 6.0     Protein, UA 06/13/2022 Negative     Urobilinogen, Urine 06/13/2022 0.2     Nitrite, Urine 06/13/2022 Negative     Leukocyte Esterase, Urine 06/13/2022 Negative     Microscopic Examination 06/13/2022 Not Indicated     Urine Type 06/13/2022 NotGiven     Urine Reflex to Culture 06/13/2022 Not Indicated     Ventricular Rate 06/13/2022 72     Atrial Rate 06/13/2022 72     P-R Interval 06/13/2022 152     QRS Duration 06/13/2022 88     Q-T Interval 06/13/2022 402     QTc Calculation (Bazett) 06/13/2022 440     P Axis 06/13/2022 64     R [place] 06/13/2022 47     T Axis 06/13/2022 41     Diagnosis 06/13/2022 Normal sinus rhythmPossible Left atrial enlargementNonspecific ST abnormalityAbnormal ECGConfirmed by Zandra Badillo MD, Yousuf Amado ([id number]) on 6/13/2022 4:35:07 PM     hCG Qual 06/13/2022 Negative     Magnesium 06/13/2022 1.90     Troponin 06/13/2022 <0.01     Troponin 06/13/2022 <0.01     Magnesium 06/13/2022 2.00     TSH 06/13/2022 0.96     T4 Free 06/13/2022 1.1     Left Ventricular Ejectio* 06/14/2022 70     LVEF MODALITY 06/14/2022 Nuclear     Left Ventricular Ejectio* 06/15/2022 58     LVEF MODALITY 06/15/2022 ECHO     Sodium 06/14/2022 136     Potassium reflex Magnesi* 06/14/2022 4.4     Chloride 06/14/2022 101     CO2 06/14/2022 21     Anion Gap 06/14/2022 14     Glucose 06/14/2022 134 (A)     BUN 06/14/2022 13     Creatinine 06/14/2022 <0.5 (A)     GFR Non- 06/14/2022 >60     GFR  06/14/2022 >60     Calcium 06/14/2022 9.9     WBC 06/14/2022 12.1 (A)     RBC 06/14/2022 3.95 (A)     Hemoglobin 06/14/2022 11.7 (A)     Hematocrit 06/14/2022 35.7 (A)     MCV 06/14/2022 90.5     MCH 06/14/2022 29.5     MCHC 06/14/2022 32.7     RDW 06/14/2022 13.1     Platelets 49/21/7633 323     MPV 06/14/2022 8.2     Hemoglobin A1C 06/14/2022 5.9     eAG 06/14/2022 122.6     Troponin 06/14/2022 <0.01     Cholesterol, Total 06/14/2022 209 (A)     Triglycerides 06/14/2022 71     HDL 06/14/2022 60     LDL Calculated 06/14/2022 135 (A)     VLDL Cholesterol Calcula* 06/14/2022 14     Ventricular Rate 06/14/2022 64     Atrial Rate 06/14/2022 64     P-R Interval 06/14/2022 148     QRS Duration 06/14/2022 98     Q-T Interval 06/14/2022 426     QTc Calculation (Bazett) 06/14/2022 439     P Axis 06/14/2022 51     R [place] 06/14/2022 12     T Axis 06/14/2022 12     Diagnosis 06/14/2022 Normal sinus rhythmNonspecific ST & T wave changesWhen compared with ECG of 13-JUN-2022 14:16,Nonspecific T wave abnormality now evident in Inferior leadsConfirmed by Nahid De La Garza MD, Hortensia Oleary ([id number]) on 6/14/2022 7:20:15 PM           Assessment/Plan:  Patient is a 65year old female with a past medical history of hypertension, vertigo and IBS who presents today for chronic care follow-up. 1. Primary hypertension  - At goal  - BP in office today of 132/70  - Continue losartan 25 mg daily  - Will obtain BMP and urine microalbumin today  - Patient will follow up in 6 months. 2. Mixed hyperlipidemia  - At goal  - Continue atorvastatin 40 mg  - Will repeat lipid panel today     3. Pre-diabetes  - Not at goal  - Hemoglobin A1c completed on 6/14/2022 was 5.9%  - Counseled patient on importance of 150 minutes of moderate intensity exercise weekly  - Encourage patient to caloric restrict and to limit processed carbs  - Will repeat hemoglobin A1c today  - Patient will follow up in 6 months    4.   Irritant contact dermatitis due to other chemical products  - Not at goal  - Likely secondary to her deodorant  - Encourage patient to stop all deodorant and avoid shaving her underarms  - Informed patient that she can apply Benadryl cream to help with the itching  - She can use sensitive skin baby wipes if she feels like she needs to to the area  - Patient is to call the office if symptoms do not improve. - If symptoms or not improving consider ringworm in the differential    5. Healthcare maintenance  - Order was placed for screening mammogram  - Encourage patient to schedule with [address] for Pap smear  - Patient declined COVID and flu vaccination today  - Patient is up-to-date on her colonoscopy and is on a 10-year recall. Health Maintenance Due:  Health Maintenance Due   Topic Date Due    Cervical cancer screen  08/24/2020    Breast cancer screen  05/17/2021          Health care decision maker:  <72years old      Health Maintenance: (USPSTF Recommendations)  (F) Breast Cancer Screen: (40-49 (C), 50-74 biennial screening mammogram (B))  (F) Cervical Cancer Screen: (21-29 q3yr cytology alone; 30-65 q3yr cytology alone, q5yr with hrHPV alone, or q5yr cytology+hrHPV (A))  (M) Prostate Cancer Screen: (54-79 yo discuss benefits/harm, does not recommend testing PSA in men >73 yo (D):   (M) AAA Screen: (men 73-67 yo who has ever smoked (B), consider in nonsmokers if high risk):  CRC/Colonoscopy Screening: (adults 39-53 (B), 50-75 (A))  Lung Ca Screening: Annual LDCT (+smoker age 49-80, smoked within 15 years, total of 20 pack yr history (B)):  DEXA Screen: (women >65 and older, <65 if at risk/postmenopausal (B))  HIV Screen: (16-65 yr old, and all pregnant patients (A)): Hep C Screen: (18-79 yr old (B)):  HCC Screen: (all pts with cirrhosis and high risk Hep B (US q6 mo)):  Immunizations:    RTC:  Return in about 6 months (around 8/6/2023) for follow up chronic conditions. EMR Dragon/transcription disclaimer:  Much of this encounter note is electronic transcription/translation of spoken language to printed texts. The electronic translation of spoken language may be erroneous, or at times, nonsensical words or phrases may be inadvertently transcribed.   Although I have reviewed the note for such errors, some may still exist.       Magalys Osorio, DO PGY-2  [facility] and [facility] Medicine Res[facility]

## 2023-02-06 ENCOUNTER — OFFICE VISIT (OUTPATIENT)
Dept: PRIMARY CARE CLINIC | Age: 58
End: 2023-02-06
Payer: COMMERCIAL

## 2023-02-06 VITALS
TEMPERATURE: 98.2 F | WEIGHT: 254 LBS | RESPIRATION RATE: 18 BRPM | HEIGHT: 69 IN | SYSTOLIC BLOOD PRESSURE: 132 MMHG | BODY MASS INDEX: 37.62 KG/M2 | HEART RATE: 79 BPM | OXYGEN SATURATION: 98 % | DIASTOLIC BLOOD PRESSURE: 70 MMHG

## 2023-02-06 DIAGNOSIS — Z12.31 SCREENING MAMMOGRAM FOR BREAST CANCER: ICD-10-CM

## 2023-02-06 DIAGNOSIS — L24.5 IRRITANT CONTACT DERMATITIS DUE TO OTHER CHEMICAL PRODUCTS: ICD-10-CM

## 2023-02-06 DIAGNOSIS — Z00.00 HEALTHCARE MAINTENANCE: ICD-10-CM

## 2023-02-06 DIAGNOSIS — R73.03 PREDIABETES: ICD-10-CM

## 2023-02-06 DIAGNOSIS — I10 PRIMARY HYPERTENSION: Primary | ICD-10-CM

## 2023-02-06 DIAGNOSIS — E78.2 MIXED HYPERLIPIDEMIA: ICD-10-CM

## 2023-02-06 PROBLEM — R42 DIZZINESS: Status: RESOLVED | Noted: 2022-06-13 | Resolved: 2023-02-06

## 2023-02-06 PROCEDURE — 3078F DIAST BP <80 MM HG: CPT

## 2023-02-06 PROCEDURE — G8427 DOCREV CUR MEDS BY ELIG CLIN: HCPCS

## 2023-02-06 PROCEDURE — G8484 FLU IMMUNIZE NO ADMIN: HCPCS

## 2023-02-06 PROCEDURE — 1036F TOBACCO NON-USER: CPT

## 2023-02-06 PROCEDURE — G8417 CALC BMI ABV UP PARAM F/U: HCPCS

## 2023-02-06 PROCEDURE — 3075F SYST BP GE 130 - 139MM HG: CPT

## 2023-02-06 PROCEDURE — 3017F COLORECTAL CA SCREEN DOC REV: CPT

## 2023-02-06 PROCEDURE — 99214 OFFICE O/P EST MOD 30 MIN: CPT

## 2023-02-06 SDOH — ECONOMIC STABILITY: HOUSING INSECURITY
IN THE LAST 12 MONTHS, WAS THERE A TIME WHEN YOU DID NOT HAVE A STEADY PLACE TO SLEEP OR SLEPT IN A SHELTER (INCLUDING NOW)?: NO

## 2023-02-06 SDOH — ECONOMIC STABILITY: FOOD INSECURITY: WITHIN THE PAST 12 MONTHS, YOU WORRIED THAT YOUR FOOD WOULD RUN OUT BEFORE YOU GOT MONEY TO BUY MORE.: NEVER TRUE

## 2023-02-06 SDOH — ECONOMIC STABILITY: FOOD INSECURITY: WITHIN THE PAST 12 MONTHS, THE FOOD YOU BOUGHT JUST DIDN'T LAST AND YOU DIDN'T HAVE MONEY TO GET MORE.: NEVER TRUE

## 2023-02-06 SDOH — ECONOMIC STABILITY: INCOME INSECURITY: HOW HARD IS IT FOR YOU TO PAY FOR THE VERY BASICS LIKE FOOD, HOUSING, MEDICAL CARE, AND HEATING?: NOT HARD AT ALL

## 2023-02-06 ASSESSMENT — ENCOUNTER SYMPTOMS
CONSTIPATION: 0
COUGH: 0
DIARRHEA: 0
SHORTNESS OF BREATH: 0
ABDOMINAL PAIN: 0
RHINORRHEA: 0

## 2023-02-06 ASSESSMENT — PATIENT HEALTH QUESTIONNAIRE - PHQ9
SUM OF ALL RESPONSES TO PHQ QUESTIONS 1-9: 0
9. THOUGHTS THAT YOU WOULD BE BETTER OFF DEAD, OR OF HURTING YOURSELF: 0
SUM OF ALL RESPONSES TO PHQ QUESTIONS 1-9: 0
2. FEELING DOWN, DEPRESSED OR HOPELESS: 0
7. TROUBLE CONCENTRATING ON THINGS, SUCH AS READING THE NEWSPAPER OR WATCHING TELEVISION: 0
3. TROUBLE FALLING OR STAYING ASLEEP: 0
8. MOVING OR SPEAKING SO SLOWLY THAT OTHER PEOPLE COULD HAVE NOTICED. OR THE OPPOSITE, BEING SO FIGETY OR RESTLESS THAT YOU HAVE BEEN MOVING AROUND A LOT MORE THAN USUAL: 0
SUM OF ALL RESPONSES TO PHQ QUESTIONS 1-9: 0
6. FEELING BAD ABOUT YOURSELF - OR THAT YOU ARE A FAILURE OR HAVE LET YOURSELF OR YOUR FAMILY DOWN: 0
1. LITTLE INTEREST OR PLEASURE IN DOING THINGS: 0
4. FEELING TIRED OR HAVING LITTLE ENERGY: 0
SUM OF ALL RESPONSES TO PHQ9 QUESTIONS 1 & 2: 0
5. POOR APPETITE OR OVEREATING: 0
SUM OF ALL RESPONSES TO PHQ QUESTIONS 1-9: 0

## 2023-02-06 ASSESSMENT — ANXIETY QUESTIONNAIRES
3. WORRYING TOO MUCH ABOUT DIFFERENT THINGS: 0
GAD7 TOTAL SCORE: 0
7. FEELING AFRAID AS IF SOMETHING AWFUL MIGHT HAPPEN: 0
6. BECOMING EASILY ANNOYED OR IRRITABLE: 0
4. TROUBLE RELAXING: 0
1. FEELING NERVOUS, ANXIOUS, OR ON EDGE: 0
5. BEING SO RESTLESS THAT IT IS HARD TO SIT STILL: 0
2. NOT BEING ABLE TO STOP OR CONTROL WORRYING: 0

## 2023-03-28 RX ORDER — LOSARTAN POTASSIUM 25 MG/1
25 TABLET ORAL DAILY
Qty: 90 TABLET | Refills: 0 | OUTPATIENT
Start: 2023-03-28

## 2023-03-30 NOTE — TELEPHONE ENCOUNTER
Refill Request       Last Seen: Last Seen Department: 2/6/2023  Last Seen by PCP: Visit date not found    Last Written: 12/27/22    Next Appointment: due 8/6, schedule not open  No future appointments.           Requested Prescriptions     Pending Prescriptions Disp Refills    atorvastatin (LIPITOR) 40 MG tablet [Pharmacy Med Name: ATORVASTATIN 40MG TABLETS] 90 tablet 0     Sig: TAKE 1 TABLET BY MOUTH EVERY NIGHT

## 2023-03-31 RX ORDER — ATORVASTATIN CALCIUM 40 MG/1
TABLET, FILM COATED ORAL
Qty: 90 TABLET | Refills: 0 | Status: SHIPPED | OUTPATIENT
Start: 2023-03-31

## 2023-04-04 RX ORDER — LOSARTAN POTASSIUM 25 MG/1
25 TABLET ORAL DAILY
Qty: 90 TABLET | Refills: 0 | Status: SHIPPED | OUTPATIENT
Start: 2023-04-04

## 2023-04-04 NOTE — TELEPHONE ENCOUNTER
Refill Request       Last Seen: Last Seen Department: 2/6/2023  Last Seen by PCP: 2/6/2023    Last Written: losartan (COZAAR) 25 MG tablet-1/4/2023 90 with 0 refills     Next Appointment:   No future appointments.           Requested Prescriptions     Pending Prescriptions Disp Refills    losartan (COZAAR) 25 MG tablet 90 tablet 0     Sig: Take 1 tablet by mouth daily

## 2023-04-04 NOTE — TELEPHONE ENCOUNTER
Patient is calling her rx request for losartan (COZAAR) 25 MG tablet [Pharmacy Med Name: Anthon Burkitt 25MG TABLETS] was denied and patient is not due back until 8/6 and is waiting on her schedule but could not make a follow up while she was here due to schedule not being opened at the time.    Please advise

## 2023-05-02 RX ORDER — LOSARTAN POTASSIUM 25 MG/1
25 TABLET ORAL DAILY
Qty: 90 TABLET | Refills: 0 | OUTPATIENT
Start: 2023-05-02

## 2023-05-31 RX ORDER — LOSARTAN POTASSIUM 25 MG/1
25 TABLET ORAL DAILY
Qty: 90 TABLET | Refills: 0 | Status: SHIPPED | OUTPATIENT
Start: 2023-05-31

## 2023-05-31 NOTE — TELEPHONE ENCOUNTER
Refill Request       Last Seen: Last Seen Department: 2/6/2023  Last Seen by PCP: 2/6/2023    Last Written: 4/4/2023 90 with 0 refills    Next Appointment:   No future appointments.           Requested Prescriptions     Pending Prescriptions Disp Refills    losartan (COZAAR) 25 MG tablet [Pharmacy Med Name: LOSARTAN 25MG TABLETS] 90 tablet 0     Sig: TAKE 1 TABLET BY MOUTH DAILY

## 2023-07-03 NOTE — TELEPHONE ENCOUNTER
Refill Request   WAS TOLD TO FOLLOW UP IN 6 MONTHS - 8/6/2023    Last Seen: Last Seen Department: 2/6/2023  Last Seen by PCP: 2/6/2023    Last Written: 03/31/2023 #90 WITH 0    Next Appointment:   No future appointments.         Requested Prescriptions     Pending Prescriptions Disp Refills    atorvastatin (LIPITOR) 40 MG tablet [Pharmacy Med Name: ATORVASTATIN 40MG TABLETS] 90 tablet 0     Sig: TAKE 1 TABLET BY MOUTH EVERY NIGHT

## 2023-07-05 RX ORDER — ATORVASTATIN CALCIUM 40 MG/1
TABLET, FILM COATED ORAL
Qty: 90 TABLET | Refills: 0 | Status: SHIPPED | OUTPATIENT
Start: 2023-07-05

## 2023-08-07 ENCOUNTER — OFFICE VISIT (OUTPATIENT)
Dept: PRIMARY CARE CLINIC | Age: 58
End: 2023-08-07
Payer: COMMERCIAL

## 2023-08-07 ENCOUNTER — HOSPITAL ENCOUNTER (OUTPATIENT)
Age: 58
Setting detail: SPECIMEN
Discharge: HOME | End: 2023-08-07
Payer: COMMERCIAL

## 2023-08-07 VITALS
OXYGEN SATURATION: 98 % | DIASTOLIC BLOOD PRESSURE: 86 MMHG | WEIGHT: 247 LBS | HEIGHT: 69 IN | HEART RATE: 74 BPM | TEMPERATURE: 96.6 F | BODY MASS INDEX: 36.58 KG/M2 | SYSTOLIC BLOOD PRESSURE: 126 MMHG

## 2023-08-07 DIAGNOSIS — R07.89 TENDERNESS OF CHEST WALL: ICD-10-CM

## 2023-08-07 DIAGNOSIS — R73.03 PRE-DIABETES: ICD-10-CM

## 2023-08-07 DIAGNOSIS — I10 PRIMARY HYPERTENSION: Primary | ICD-10-CM

## 2023-08-07 DIAGNOSIS — E78.2 MIXED HYPERLIPIDEMIA: ICD-10-CM

## 2023-08-07 DIAGNOSIS — M54.2 NECK PAIN: ICD-10-CM

## 2023-08-07 DIAGNOSIS — I10 PRIMARY HYPERTENSION: ICD-10-CM

## 2023-08-07 LAB
ALBUMIN SERPL-MCNC: 4.3 G/DL (ref 3.4–5)
ALBUMIN/GLOB SERPL: 1.2 {RATIO} (ref 1.1–2.2)
ALP SERPL-CCNC: 131 U/L (ref 40–129)
ALT SERPL-CCNC: 28 U/L (ref 10–40)
ANION GAP SERPL CALCULATED.3IONS-SCNC: 8 MMOL/L (ref 3–16)
AST SERPL-CCNC: 31 U/L (ref 15–37)
BASOPHILS # BLD: 0 K/UL (ref 0–0.2)
BASOPHILS NFR BLD: 0.4 %
BILIRUB SERPL-MCNC: 0.8 MG/DL (ref 0–1)
BUN SERPL-MCNC: 11 MG/DL (ref 7–20)
CALCIUM SERPL-MCNC: 9.9 MG/DL (ref 8.3–10.6)
CHLORIDE SERPL-SCNC: 102 MMOL/L (ref 99–110)
CHOLEST SERPL-MCNC: 130 MG/DL (ref 0–199)
CO2 SERPL-SCNC: 27 MMOL/L (ref 21–32)
CREAT SERPL-MCNC: 0.6 MG/DL (ref 0.6–1.1)
DEPRECATED RDW RBC AUTO: 13.2 % (ref 12.4–15.4)
EOSINOPHIL # BLD: 0.3 K/UL (ref 0–0.6)
EOSINOPHIL NFR BLD: 3.6 %
GFR SERPLBLD CREATININE-BSD FMLA CKD-EPI: >60 ML/MIN/{1.73_M2}
GLUCOSE SERPL-MCNC: 93 MG/DL (ref 70–99)
HCT VFR BLD AUTO: 35 % (ref 36–48)
HDLC SERPL-MCNC: 58 MG/DL (ref 40–60)
HGB BLD-MCNC: 12.2 G/DL (ref 12–16)
LDLC SERPL CALC-MCNC: 56 MG/DL
LYMPHOCYTES # BLD: 1.7 K/UL (ref 1–5.1)
LYMPHOCYTES NFR BLD: 24.2 %
MCH RBC QN AUTO: 31.2 PG (ref 26–34)
MCHC RBC AUTO-ENTMCNC: 34.8 G/DL (ref 31–36)
MCV RBC AUTO: 89.6 FL (ref 80–100)
MONOCYTES # BLD: 0.4 K/UL (ref 0–1.3)
MONOCYTES NFR BLD: 5.2 %
NEUTROPHILS # BLD: 4.6 K/UL (ref 1.7–7.7)
NEUTROPHILS NFR BLD: 66.6 %
PLATELET # BLD AUTO: 274 K/UL (ref 135–450)
PMV BLD AUTO: 8.8 FL (ref 5–10.5)
POTASSIUM SERPL-SCNC: 4.4 MMOL/L (ref 3.5–5.1)
PROT SERPL-MCNC: 7.9 G/DL (ref 6.4–8.2)
RBC # BLD AUTO: 3.91 M/UL (ref 4–5.2)
SODIUM SERPL-SCNC: 137 MMOL/L (ref 136–145)
TRIGL SERPL-MCNC: 80 MG/DL (ref 0–150)
VLDLC SERPL CALC-MCNC: 16 MG/DL
WBC # BLD AUTO: 6.9 K/UL (ref 4–11)

## 2023-08-07 PROCEDURE — G8427 DOCREV CUR MEDS BY ELIG CLIN: HCPCS

## 2023-08-07 PROCEDURE — 3079F DIAST BP 80-89 MM HG: CPT

## 2023-08-07 PROCEDURE — 3017F COLORECTAL CA SCREEN DOC REV: CPT

## 2023-08-07 PROCEDURE — 99214 OFFICE O/P EST MOD 30 MIN: CPT

## 2023-08-07 PROCEDURE — 80053 COMPREHEN METABOLIC PANEL: CPT

## 2023-08-07 PROCEDURE — 80061 LIPID PANEL: CPT

## 2023-08-07 PROCEDURE — 85025 COMPLETE CBC W/AUTO DIFF WBC: CPT

## 2023-08-07 PROCEDURE — 83036 HEMOGLOBIN GLYCOSYLATED A1C: CPT

## 2023-08-07 PROCEDURE — 1036F TOBACCO NON-USER: CPT

## 2023-08-07 PROCEDURE — 36415 COLL VENOUS BLD VENIPUNCTURE: CPT

## 2023-08-07 PROCEDURE — 3074F SYST BP LT 130 MM HG: CPT

## 2023-08-07 PROCEDURE — G8417 CALC BMI ABV UP PARAM F/U: HCPCS

## 2023-08-07 NOTE — PATIENT INSTRUCTIONS
[facility], [address] [address] Replaced by [facility], [address]  Call to schedule: [phone]  Fax: [phone]

## 2023-08-08 LAB
EST. AVERAGE GLUCOSE BLD GHB EST-MCNC: 128.4 MG/DL
HBA1C MFR BLD: 6.1 %

## 2023-08-29 RX ORDER — LOSARTAN POTASSIUM 25 MG/1
25 TABLET ORAL DAILY
Qty: 90 TABLET | Refills: 0 | Status: SHIPPED | OUTPATIENT
Start: 2023-08-29

## 2023-09-13 NOTE — PROGRESS NOTES
[address] and [facility]idency [facility]                                             [address] [facility], [facility], [address] [place] [address]        Phone: [phone]                                     Name:  Sabine Thomas  :    1965      Consultants:   Patient Care Team:  David Moyer DO as PCP - General (Family Medicine)  Talha Savage DO as PCP - [address] [place] [address] [facility]  Zaire Smiley MD as Consulting Physician (Obstetrics & Gynecology)    Chief Complaint:     Sabine Thomas is a 65 y.o. female  who presents today for a New Patient care visit with Personalized Prevention Plan Services as noted below. HPI:     Patient is a 65year old female with a past medical history of hypertension, vertigo and IBS who presents today to \[facility]\"" care. Hospital Follow-up   Patient was discharged from [facility] on 2022. She was admitted for chest pain and dizziness. Work-up was negative for ischemia. She was sicharged on 81 mg of aspirin and to continue atorvastatin 40 mg. Stress testing at the time was normal. Echo was negative but did show intracardiac shunt. She was discharged with a cardiac monitor and with follow-up with cardiology. Patient is to follow-up with cardiology tomorrow, 2022. She denies any chest pain at this time. Hypertension  Patient is currently on losartan 25 milligrams. She is tolerating the medication without side effect. She denies any new vision changes or headaches. Vertigo    Patient denies any further episodes of vertigo. She was complaining of these episodes while she was admitted to the hospital.    Prediabetes   Patient had an hemoglobin A1c completed during her hospital stay that showed 5.9%. She denies any symptoms of hypo or hyperglycemia. Hyperlipidemia   Patient is currently on atorvastatin 40 mg. She denies any side effects from the medication.      Right arm pain  Patient states for the last couple of days she has had a sharp stabbing pain in her right forearm. She denies any numbness or tingling. She denies any muscular weakness. She denies any repetitive movement with screwdriver, tennis or golf. She does state however that she has been typing a lot recently and this could be contributing. She has not tried any over-the-counter pain relievers or ice. Patient Active Problem List   Diagnosis    Primary hypertension    Obesity (BMI 30-39. 9)    Dizziness    Peripheral vertigo, bilateral    Pre-diabetes    Mixed hyperlipidemia         Past Medical History:    Past Medical History:   Diagnosis Date    Bursitis     Chest pain 06/13/2022    colonoscopy 03/06/2018    Dyspnea     GERD (gastroesophageal reflux disease)     Hypertension     Lipoma of left lower extremity     Left hip lipoma removed       Past Surgical History:  Past Surgical History:   Procedure Laterality Date    CHOLECYSTECTOMY      COLONOSCOPY  03/06/2018       Home Meds:  Prior to Visit Medications    Medication Sig Taking?  Authorizing Provider   losartan (COZAAR) 25 MG tablet Take 25 mg by mouth daily Yes Historical Provider, MD   aspirin 81 MG chewable tablet Take 1 tablet by mouth daily Yes Shirline Quill, DO   atorvastatin (LIPITOR) 40 MG tablet Take 1 tablet by mouth nightly Yes Shirline Quill, DO       Allergies:    Zinc, Codeine, and Adhesive tape    Family History:       Problem Relation Age of Onset    Diabetes Father     Cancer Father     Colon Cancer Father     Mental Retardation Sister     Diabetes Sister     High Blood Pressure Brother     Diabetes Sister        Social History:  Social History       Tobacco History       Smoking Status  Never      Smokeless Tobacco Use  Never              Alcohol History       Alcohol Use Status  No              Drug Use       Drug Use Status  No              Sexual Activity       Sexually Active  Yes Partners  Male                       Health Maintenance Completed:  Health Maintenance   Topic Date Due    HIV screen  Never done    Cervical cancer screen  08/24/2020    Breast cancer screen  05/17/2021    COVID-19 Vaccine (3 - Booster for Pfizer series) 12/10/2021    Depression Screen  06/28/2022    Shingles vaccine (1 of 2) 07/10/2023 (Originally 3/3/2016)    Flu vaccine (1) 09/01/2022    A1C test (Diabetic or Prediabetic)  06/14/2023    Lipids  06/14/2023    Colorectal Cancer Screen  03/06/2028    DTaP/Tdap/Td vaccine (2 - Td or Tdap) 10/23/2030    Hepatitis C screen  Completed    Hepatitis A vaccine  Aged Out    Hepatitis B vaccine  Aged Out    Hib vaccine  Aged Out    Meningococcal (ACWY) vaccine  Aged Out    Pneumococcal 0-64 years Vaccine  Aged [employer] History   Administered Date(s) Administered    COVID-19, PFIZER PURPLE top, DILUTE for use, (age 15 y+), 30mcg/0.3mL 06/19/2021, 06/19/2021, 07/10/2021    Influenza, Quadv, IM, PF (6 mo and older Fluzone, Flulaval, Fluarix, and 3 yrs and older Afluria) 10/08/2019, 10/23/2020    Tdap (Boostrix, Adacel) 10/23/2020         Review of Systems:  Review of Systems   Constitutional:  Negative for chills and fever. HENT:  Negative for congestion and rhinorrhea. Eyes:  Negative for visual disturbance. Respiratory:  Negative for cough and shortness of breath. Cardiovascular:  Negative for chest pain. Gastrointestinal:  Negative for constipation and diarrhea. Neurological:  Negative for dizziness and headaches. Physical Exam:   Vitals:    07/25/22 1545   BP: 128/82   Site: Left Upper Arm   Position: Sitting   Cuff Size: Large Adult   Pulse: 66   Resp: 18   Temp: 97.5 °F (36.4 °C)   TempSrc: Infrared   SpO2: 97%   Weight: 251 lb 6.4 oz (114 kg)   Height: 5' 9\" (1.753 m)     Body mass index is 37.13 kg/m².     Wt Readings from Last 3 Encounters:   07/25/22 251 lb 6.4 oz (114 kg)   06/13/22 250 lb (113.4 kg)   12/31/21 250 lb (113.4 kg)       BP Readings from Last 3 Encounters: 07/25/22 128/82   06/15/22 136/79   12/31/21 (!) 146/88       Physical Exam  Constitutional:       General: She is not in acute distress. Appearance: Normal appearance. She is obese. She is not ill-appearing. HENT:      Head: Normocephalic and atraumatic. Right Ear: Tympanic membrane, ear canal and external ear normal.      Left Ear: Tympanic membrane, ear canal and external ear normal.      Nose: Nose normal.      Mouth/Throat:      Mouth: Mucous membranes are moist.      Pharynx: Oropharynx is clear. Eyes:      Extraocular Movements: Extraocular movements intact. Conjunctiva/sclera: Conjunctivae normal.      Pupils: Pupils are equal, round, and reactive to light. Cardiovascular:      Rate and Rhythm: Normal rate and regular rhythm. Pulses: Normal pulses. Heart sounds: Normal heart sounds. No murmur heard. No friction rub. No gallop. Pulmonary:      Effort: Pulmonary effort is normal.      Breath sounds: Normal breath sounds. No wheezing, rhonchi or rales. Abdominal:      General: Abdomen is flat. Bowel sounds are normal. There is no distension. Palpations: Abdomen is soft. Tenderness: There is no abdominal tenderness. Musculoskeletal:         General: Normal range of motion. Right forearm: No swelling, edema, deformity or tenderness. Left forearm: Normal.      Cervical back: Normal range of motion and neck supple. No rigidity or tenderness. Lymphadenopathy:      Cervical: No cervical adenopathy. Skin:     General: Skin is warm and dry. Neurological:      General: No focal deficit present. Mental Status: She is alert and oriented to person, place, and time. Psychiatric:         Mood and Affect: Mood normal.         Behavior: Behavior normal.         Thought Content:  Thought content normal.         Judgment: Judgment normal.            Lab Review:   Admission on 06/13/2022, Discharged on 06/15/2022   Component Date Value    WBC 06/13/2022 8.4 06/13/2022 402     QTc Calculation (Bazett) 06/13/2022 440     P Axis 06/13/2022 64     R [place] 06/13/2022 47     T Axis 06/13/2022 41     Diagnosis 06/13/2022 Normal sinus rhythmPossible Left atrial enlargementNonspecific ST abnormalityAbnormal ECGConfirmed by Gregory Saavedra MD, Franklin Bethanie ([id number]) on 6/13/2022 4:35:07 PM     hCG Qual 06/13/2022 Negative     Magnesium 06/13/2022 1.90     Troponin 06/13/2022 <0.01     Troponin 06/13/2022 <0.01     Magnesium 06/13/2022 2.00     TSH 06/13/2022 0.96     T4 Free 06/13/2022 1.1     Left Ventricular Ejectio* 06/14/2022 70     LVEF MODALITY 06/14/2022 Nuclear     Left Ventricular Ejectio* 06/15/2022 58     LVEF MODALITY 06/15/2022 ECHO     Sodium 06/14/2022 136     Potassium reflex Magnesi* 06/14/2022 4.4     Chloride 06/14/2022 101     CO2 06/14/2022 21     Anion Gap 06/14/2022 14     Glucose 06/14/2022 134 (A)    BUN 06/14/2022 13     Creatinine 06/14/2022 <0.5 (A)    GFR Non- 06/14/2022 >60     GFR  06/14/2022 >60     Calcium 06/14/2022 9.9     WBC 06/14/2022 12.1 (A)    RBC 06/14/2022 3.95 (A)    Hemoglobin 06/14/2022 11.7 (A)    Hematocrit 06/14/2022 35.7 (A)    MCV 06/14/2022 90.5     MCH 06/14/2022 29.5     MCHC 06/14/2022 32.7     RDW 06/14/2022 13.1     Platelets 16/63/1052 323     MPV 06/14/2022 8.2     Hemoglobin A1C 06/14/2022 5.9     eAG 06/14/2022 122.6     Troponin 06/14/2022 <0.01     Cholesterol, Total 06/14/2022 209 (A)    Triglycerides 06/14/2022 71     HDL 06/14/2022 60     LDL Calculated 06/14/2022 135 (A)    VLDL Cholesterol Calcula* 06/14/2022 14     Ventricular Rate 06/14/2022 64     Atrial Rate 06/14/2022 64     P-R Interval 06/14/2022 148     QRS Duration 06/14/2022 98     Q-T Interval 06/14/2022 426     QTc Calculation (Bazett) 06/14/2022 439     P Axis 06/14/2022 51     R [place] 06/14/2022 12     T Axis 06/14/2022 12     Diagnosis 06/14/2022 Normal sinus rhythmNonspecific ST & T wave changesWhen compared with ECG of 13-JUN-2022 14:16,Nonspecific T wave abnormality now evident in Inferior leadsConfirmed by Franklin Stuart MD, Jono Tinajero ([id number]) on 6/14/2022 7:20:15 PM           Assessment/Plan:  Patient is a 65year old female with a past medical history of hypertension, vertigo and IBS who presents today to establish care. 1. Primary hypertension  - At goal  - BP in office today of 128/82  - Continue losartan 25 mg daily  - Will obtain BMP and urine microalbumin today  - Patient will follow up in 6 months. 2. Mixed hyperlipidemia  - At goal  - Continue atorvastatin 40 mg  - Will repeat a fasted lipid panel to ensure that her LDL cholesterol has decreased    3. Pre-diabetes  - Not at goal  - Hemoglobin A1c completed on 6/14/2022 was 5.9%  - Counseled patient on importance of 150 minutes of moderate intensity exercise weekly  - Encourage patient to caloric restrict and to limit processed carbs  - Patient will follow up in 6 months    4. Peripheral vertigo, bilateral  - Resolved  - Encouraged patient to return to clinic if vertigo worsens and can consider vestibular therapy at this time. 5. Right arm pain  - Etiology unclear however likely musculoskeletal.  - Encouraged patient to take over-the-counter pain relievers and to ice if pain occurs. 6. Obesity (BMI 30-39.9)  - Not at goal  - Encouraged patient to increase to 150 minutes of moderate intensity exercise weekly  - Encourage patient to caloric restricted       Health Maintenance Due:  Health Maintenance Due   Topic Date Due    HIV screen  Never done    Cervical cancer screen  08/24/2020    Breast cancer screen  05/17/2021    COVID-19 Vaccine (3 - Booster for Pfizer series) 12/10/2021    Depression Screen  06/28/2022        Health care decision maker:  <72years old      Health Maintenance: (USPSTF Recommendations)  (F) Breast Cancer Screen: (40-49 (C), 50-74 biennial screening mammogram (B)): Completed 5/17/2019, benign. Due 5/2020.   Patient believes that she had a mammogram last year. Will obtain her records from [facility]. (F) Cervical Cancer Screen: (21-29 q3yr cytology alone; 30-65 q3yr cytology alone, q5yr with hrHPV alone, or q5yr cytology+hrHPV (A)): Patient states that she has had a Pap smear. We will obtain her records from [facility]. CRC/Colonoscopy Screening: (adults 45-49 (B), 50-75 (A)): Completed on 3/6/2018, normal. Repeat in 5 years. Due 3/2023  HIV Screen: (15-65 yr old, and all pregnant patients (A)): ordered   Hep C Screen: (18-79 yr old (B)): Negative   Immunizations:    RTC:  Return in about 6 months (around 1/25/2023) for follow up chronic conditions. EMR Dragon/transcription disclaimer:  Much of this encounter note is electronic transcription/translation of spoken language to printed texts. The electronic translation of spoken language may be erroneous, or at times, nonsensical words or phrases may be inadvertently transcribed.   Although I have reviewed the note for such errors, some may still exist.     Libia Noguera DO   PGY-2  [facility] Medicine Residency Yes

## 2023-09-20 ENCOUNTER — OFFICE VISIT (OUTPATIENT)
Dept: ENT CLINIC | Age: 58
End: 2023-09-20
Payer: COMMERCIAL

## 2023-09-20 VITALS
BODY MASS INDEX: 35.87 KG/M2 | SYSTOLIC BLOOD PRESSURE: 131 MMHG | HEART RATE: 76 BPM | DIASTOLIC BLOOD PRESSURE: 82 MMHG | WEIGHT: 242.2 LBS | HEIGHT: 69 IN | OXYGEN SATURATION: 98 % | TEMPERATURE: 97.1 F

## 2023-09-20 DIAGNOSIS — R09.89 GLOBUS SENSATION: Primary | ICD-10-CM

## 2023-09-20 DIAGNOSIS — J06.9 VIRAL URI: ICD-10-CM

## 2023-09-20 PROCEDURE — 99203 OFFICE O/P NEW LOW 30 MIN: CPT | Performed by: OTOLARYNGOLOGY

## 2023-09-20 PROCEDURE — G8417 CALC BMI ABV UP PARAM F/U: HCPCS | Performed by: OTOLARYNGOLOGY

## 2023-09-20 PROCEDURE — 3075F SYST BP GE 130 - 139MM HG: CPT | Performed by: OTOLARYNGOLOGY

## 2023-09-20 PROCEDURE — G8427 DOCREV CUR MEDS BY ELIG CLIN: HCPCS | Performed by: OTOLARYNGOLOGY

## 2023-09-20 PROCEDURE — 3017F COLORECTAL CA SCREEN DOC REV: CPT | Performed by: OTOLARYNGOLOGY

## 2023-09-20 PROCEDURE — 1036F TOBACCO NON-USER: CPT | Performed by: OTOLARYNGOLOGY

## 2023-09-20 PROCEDURE — 3079F DIAST BP 80-89 MM HG: CPT | Performed by: OTOLARYNGOLOGY

## 2023-09-20 PROCEDURE — 31575 DIAGNOSTIC LARYNGOSCOPY: CPT | Performed by: OTOLARYNGOLOGY

## 2023-09-20 ASSESSMENT — ENCOUNTER SYMPTOMS
COUGH: 0
EYE ITCHING: 0
TROUBLE SWALLOWING: 1
VOICE CHANGE: 0
APNEA: 0
FACIAL SWELLING: 0
SHORTNESS OF BREATH: 0
SINUS PRESSURE: 0
SORE THROAT: 1

## 2023-09-20 NOTE — PROGRESS NOTES
ear canal and external ear normal. No drainage. No middle ear effusion. Tympanic membrane is not bulging. Tympanic membrane has normal mobility. Nose: No mucosal edema or rhinorrhea. Mouth/Throat:      Lips: Pink. Mouth: Mucous membranes are moist.      Tongue: No lesions. Palate: No mass. Pharynx: Uvula midline. Eyes:      Pupils: Pupils are equal, round, and reactive to light. Neck:      Thyroid: No thyroid mass or thyromegaly. Trachea: Trachea and phonation normal.   Cardiovascular:      Pulses: Normal pulses. Pulmonary:      Effort: Pulmonary effort is normal. No accessory muscle usage or respiratory distress. Breath sounds: No stridor. Musculoskeletal:      Cervical back: Full passive range of motion without pain. Lymphadenopathy:      Head:      Right side of head: No submental or submandibular adenopathy. Left side of head: No submental or submandibular adenopathy. Cervical: No cervical adenopathy. Right cervical: No superficial, deep or posterior cervical adenopathy. Left cervical: No superficial, deep or posterior cervical adenopathy. Skin:     General: Skin is warm and dry. Neurological:      Mental Status: She is alert and oriented to person, place, and time. Cranial Nerves: No cranial nerve deficit. Coordination: Coordination normal.      Gait: Gait normal.   Psychiatric:         Thought Content: Thought content normal.           Procedure     Flexible Laryngoscopy    Pre op: Globus. Post op: Same  Procedure : Flexible Laryngoscopy  Surgeon: Luis Antonio Wolf DO  Anesthesia: Afrin with 4% lidocaine  Indication: 67-year-old female with globus and peeling of mouth laryngeal mirror examination was not tolerated due to gag reflex  Description:  The scope was passed along the floor of the right naris to the level of the larynx.  The base of tongue, vallecula, epiglottis, aryepiglottic folds, arytenoids, false vocal folds, true vocal

## 2023-10-19 ENCOUNTER — TELEPHONE (OUTPATIENT)
Dept: PRIMARY CARE CLINIC | Age: 58
End: 2023-10-19

## 2023-10-19 NOTE — TELEPHONE ENCOUNTER
Refill Request   Patient would like to know if she can get this on auto refill     Last Seen: Last Seen Department: 8/7/2023  Last Seen by PCP: 8/7/2023    Last Written: 7/5/2023 90 with 0     Next Appointment:   No future appointments.           Requested Prescriptions     Pending Prescriptions Disp Refills    atorvastatin (LIPITOR) 40 MG tablet 90 tablet 0     Sig: Take 1 tablet by mouth nightly

## 2023-10-19 NOTE — TELEPHONE ENCOUNTER
Refill request for:    atorvastatin (LIPITOR) 40 MG tablet    Patient just took last pill today. Patient is asking if she can get this on auto refill. Please advise.

## 2023-10-23 RX ORDER — ATORVASTATIN CALCIUM 40 MG/1
40 TABLET, FILM COATED ORAL NIGHTLY
Qty: 90 TABLET | Refills: 0 | Status: SHIPPED | OUTPATIENT
Start: 2023-10-23

## 2023-12-04 RX ORDER — LOSARTAN POTASSIUM 25 MG/1
25 TABLET ORAL DAILY
Qty: 90 TABLET | Refills: 0 | Status: SHIPPED | OUTPATIENT
Start: 2023-12-04

## 2023-12-04 NOTE — TELEPHONE ENCOUNTER
Refill Request     CONFIRM preferred pharmacy with the patient. If Mail Order Rx - Pend for 90 day refill. Last Seen: Last Seen Department: 8/7/2023  Last Seen by PCP: 8/7/2023    Last Written: 8/29/23 90 tabs 0 refills     If no future appointment scheduled:  Review the last OV with PCP and review information for follow-up visit,  Route STAFF MESSAGE with patient name to the [facility] for scheduling with the following information:            -  Timing of next visit           -  Visit type ie Physical, OV, etc           -  Diagnoses/Reason ie. COPD, HTN - Do not use MEDICATION, Follow-up or CHECK UP - Give reason for visit      Next Appointment:   No future appointments. Message sent to [employer] to schedule appt with patient?   NO      Requested Prescriptions     Pending Prescriptions Disp Refills    losartan (COZAAR) 25 MG tablet [Pharmacy Med Name: LOSARTAN 25MG TABLETS] 90 tablet 0     Sig: TAKE 1 TABLET BY MOUTH DAILY

## 2024-01-19 RX ORDER — ATORVASTATIN CALCIUM 40 MG/1
40 TABLET, FILM COATED ORAL NIGHTLY
Qty: 90 TABLET | Refills: 0 | Status: SHIPPED | OUTPATIENT
Start: 2024-01-19

## 2024-01-19 NOTE — TELEPHONE ENCOUNTER
Refill Request       Last Seen: Last Seen Department: 8/7/2023  Last Seen by PCP: 8/7/2023    Last Written: 10/23/23 qty 90 w/ 0    Next Appointment:   No future appointments.    Message to  to schedule appointment.         Requested Prescriptions     Pending Prescriptions Disp Refills    atorvastatin (LIPITOR) 40 MG tablet [Pharmacy Med Name: ATORVASTATIN 40MG TABLETS] 90 tablet 0     Sig: TAKE 1 TABLET BY MOUTH EVERY NIGHT

## 2024-03-07 ENCOUNTER — TELEPHONE (OUTPATIENT)
Dept: PRIMARY CARE CLINIC | Age: 59
End: 2024-03-07

## 2024-03-07 RX ORDER — LOSARTAN POTASSIUM 25 MG/1
25 TABLET ORAL DAILY
Qty: 90 TABLET | Refills: 0 | Status: SHIPPED | OUTPATIENT
Start: 2024-03-07

## 2024-03-07 NOTE — TELEPHONE ENCOUNTER
Medication Refill Request on losartan (COZAAR) 25 MG tablet. Please send to Pharmacy  [facility] [address]. Last OV 8/7/23 Next OV 3/11/24.

## 2024-03-07 NOTE — TELEPHONE ENCOUNTER
Refill Request       Last Seen: Last Seen Department: 8/7/2023  Last Seen by PCP: 8/7/2023    Last Written: 12/4/23 qty 90    Next Appointment:   No future appointments.    Message to  to schedule appointment.         Requested Prescriptions     Pending Prescriptions Disp Refills    losartan (COZAAR) 25 MG tablet [Pharmacy Med Name: LOSARTAN 25MG TABLETS] 90 tablet 0     Sig: TAKE 1 TABLET BY MOUTH DAILY

## 2024-03-11 ENCOUNTER — HOSPITAL ENCOUNTER (OUTPATIENT)
Age: 59
Discharge: HOME | End: 2024-03-11
Payer: COMMERCIAL

## 2024-03-11 ENCOUNTER — OFFICE VISIT (OUTPATIENT)
Dept: PRIMARY CARE CLINIC | Age: 59
End: 2024-03-11
Payer: COMMERCIAL

## 2024-03-11 VITALS
HEIGHT: 69 IN | BODY MASS INDEX: 37.26 KG/M2 | DIASTOLIC BLOOD PRESSURE: 74 MMHG | TEMPERATURE: 98 F | SYSTOLIC BLOOD PRESSURE: 128 MMHG | OXYGEN SATURATION: 97 % | WEIGHT: 251.6 LBS | RESPIRATION RATE: 18 BRPM | HEART RATE: 94 BPM

## 2024-03-11 DIAGNOSIS — E78.2 MIXED HYPERLIPIDEMIA: ICD-10-CM

## 2024-03-11 DIAGNOSIS — Z11.59 NEED FOR HEPATITIS B SCREENING TEST: ICD-10-CM

## 2024-03-11 DIAGNOSIS — E66.9 OBESITY (BMI 30-39.9): ICD-10-CM

## 2024-03-11 DIAGNOSIS — R73.03 PRE-DIABETES: ICD-10-CM

## 2024-03-11 DIAGNOSIS — I10 PRIMARY HYPERTENSION: Primary | ICD-10-CM

## 2024-03-11 DIAGNOSIS — R06.02 SHORTNESS OF BREATH: ICD-10-CM

## 2024-03-11 DIAGNOSIS — I10 PRIMARY HYPERTENSION: ICD-10-CM

## 2024-03-11 DIAGNOSIS — R01.1 HEART MURMUR: ICD-10-CM

## 2024-03-11 LAB
ANION GAP SERPL CALCULATED.3IONS-SCNC: 12 MMOL/L (ref 3–16)
BUN SERPL-MCNC: 11 MG/DL (ref 7–20)
CALCIUM SERPL-MCNC: 9.3 MG/DL (ref 8.3–10.6)
CHLORIDE SERPL-SCNC: 103 MMOL/L (ref 99–110)
CO2 SERPL-SCNC: 21 MMOL/L (ref 21–32)
CREAT SERPL-MCNC: 0.6 MG/DL (ref 0.6–1.1)
GFR SERPLBLD CREATININE-BSD FMLA CKD-EPI: >60 ML/MIN/{1.73_M2}
GLUCOSE SERPL-MCNC: 130 MG/DL (ref 70–99)
HBV SURFACE AB SERPL IA-ACNC: <3.5 MIU/ML
POTASSIUM SERPL-SCNC: 4.1 MMOL/L (ref 3.5–5.1)
SODIUM SERPL-SCNC: 136 MMOL/L (ref 136–145)

## 2024-03-11 PROCEDURE — 3074F SYST BP LT 130 MM HG: CPT

## 2024-03-11 PROCEDURE — 80048 BASIC METABOLIC PNL TOTAL CA: CPT

## 2024-03-11 PROCEDURE — G8484 FLU IMMUNIZE NO ADMIN: HCPCS

## 2024-03-11 PROCEDURE — 3078F DIAST BP <80 MM HG: CPT

## 2024-03-11 PROCEDURE — G8417 CALC BMI ABV UP PARAM F/U: HCPCS

## 2024-03-11 PROCEDURE — 83036 HEMOGLOBIN GLYCOSYLATED A1C: CPT

## 2024-03-11 PROCEDURE — 3017F COLORECTAL CA SCREEN DOC REV: CPT

## 2024-03-11 PROCEDURE — 36415 COLL VENOUS BLD VENIPUNCTURE: CPT

## 2024-03-11 PROCEDURE — 99214 OFFICE O/P EST MOD 30 MIN: CPT

## 2024-03-11 PROCEDURE — G8427 DOCREV CUR MEDS BY ELIG CLIN: HCPCS

## 2024-03-11 PROCEDURE — 1036F TOBACCO NON-USER: CPT

## 2024-03-11 PROCEDURE — 86706 HEP B SURFACE ANTIBODY: CPT

## 2024-03-11 RX ORDER — ATORVASTATIN CALCIUM 40 MG/1
40 TABLET, FILM COATED ORAL NIGHTLY
Qty: 90 TABLET | Refills: 1 | Status: SHIPPED | OUTPATIENT
Start: 2024-03-11

## 2024-03-11 RX ORDER — LOSARTAN POTASSIUM 25 MG/1
25 TABLET ORAL DAILY
Qty: 90 TABLET | Refills: 1 | Status: SHIPPED | OUTPATIENT
Start: 2024-03-11

## 2024-03-11 SDOH — ECONOMIC STABILITY: FOOD INSECURITY: WITHIN THE PAST 12 MONTHS, YOU WORRIED THAT YOUR FOOD WOULD RUN OUT BEFORE YOU GOT MONEY TO BUY MORE.: NEVER TRUE

## 2024-03-11 SDOH — ECONOMIC STABILITY: INCOME INSECURITY: HOW HARD IS IT FOR YOU TO PAY FOR THE VERY BASICS LIKE FOOD, HOUSING, MEDICAL CARE, AND HEATING?: NOT VERY HARD

## 2024-03-11 SDOH — ECONOMIC STABILITY: FOOD INSECURITY: WITHIN THE PAST 12 MONTHS, THE FOOD YOU BOUGHT JUST DIDN'T LAST AND YOU DIDN'T HAVE MONEY TO GET MORE.: NEVER TRUE

## 2024-03-11 ASSESSMENT — PATIENT HEALTH QUESTIONNAIRE - PHQ9
SUM OF ALL RESPONSES TO PHQ QUESTIONS 1-9: 0
1. LITTLE INTEREST OR PLEASURE IN DOING THINGS: 0
SUM OF ALL RESPONSES TO PHQ QUESTIONS 1-9: 0
SUM OF ALL RESPONSES TO PHQ QUESTIONS 1-9: 0
2. FEELING DOWN, DEPRESSED OR HOPELESS: 0
SUM OF ALL RESPONSES TO PHQ9 QUESTIONS 1 & 2: 0
SUM OF ALL RESPONSES TO PHQ QUESTIONS 1-9: 0

## 2024-03-12 LAB
EST. AVERAGE GLUCOSE BLD GHB EST-MCNC: 122.6 MG/DL
HBA1C MFR BLD: 5.9 %

## 2024-03-13 ENCOUNTER — TELEPHONE (OUTPATIENT)
Dept: PRIMARY CARE CLINIC | Age: 59
End: 2024-03-13

## 2024-03-13 DIAGNOSIS — R01.1 HEART MURMUR: Primary | ICD-10-CM

## 2024-03-13 NOTE — TELEPHONE ENCOUNTER
[facility] scheduling is calling they need a new order placed for the Echo they not longer accept orders for   ECHO 2D WO Color Doppler Complete    They need to be ordered with color.    Please advise the patient once the new order is placed so the patient can call back to schedule the ECHO    Please Advise

## 2024-04-04 ENCOUNTER — HOSPITAL ENCOUNTER (OUTPATIENT)
Dept: CARDIOLOGY | Age: 59
Discharge: HOME | End: 2024-04-04
Payer: COMMERCIAL

## 2024-04-04 DIAGNOSIS — R01.1 HEART MURMUR: ICD-10-CM

## 2024-04-04 PROCEDURE — 93306 TTE W/DOPPLER COMPLETE: CPT

## 2024-04-08 ENCOUNTER — OFFICE VISIT (OUTPATIENT)
Dept: PRIMARY CARE CLINIC | Age: 59
End: 2024-04-08
Payer: COMMERCIAL

## 2024-04-08 VITALS
HEART RATE: 71 BPM | OXYGEN SATURATION: 96 % | HEIGHT: 69 IN | WEIGHT: 252.2 LBS | DIASTOLIC BLOOD PRESSURE: 70 MMHG | TEMPERATURE: 97.1 F | SYSTOLIC BLOOD PRESSURE: 130 MMHG | RESPIRATION RATE: 18 BRPM | BODY MASS INDEX: 37.36 KG/M2

## 2024-04-08 DIAGNOSIS — Z12.31 ENCOUNTER FOR SCREENING MAMMOGRAM FOR MALIGNANT NEOPLASM OF BREAST: ICD-10-CM

## 2024-04-08 DIAGNOSIS — Z12.11 COLON CANCER SCREENING: ICD-10-CM

## 2024-04-08 DIAGNOSIS — R01.1 HEART MURMUR: Primary | ICD-10-CM

## 2024-04-08 PROCEDURE — 3078F DIAST BP <80 MM HG: CPT

## 2024-04-08 PROCEDURE — 99214 OFFICE O/P EST MOD 30 MIN: CPT

## 2024-04-08 PROCEDURE — 3017F COLORECTAL CA SCREEN DOC REV: CPT

## 2024-04-08 PROCEDURE — G8427 DOCREV CUR MEDS BY ELIG CLIN: HCPCS

## 2024-04-08 PROCEDURE — 3075F SYST BP GE 130 - 139MM HG: CPT

## 2024-04-08 PROCEDURE — 1036F TOBACCO NON-USER: CPT

## 2024-04-08 PROCEDURE — G8417 CALC BMI ABV UP PARAM F/U: HCPCS

## 2024-04-08 NOTE — PROGRESS NOTES
PROGRESS NOTE   [facility]                                  [address], [address], [place] [address]         Phone: [phone]    Date of Service:  4/8/2024     Patient ID: .Yakelin Pérez is a 59 y.o. female      Subjective:     CC: 1 month follow-up     HPI  Patient is a 59 year old woman with a past medical history of pre-diabetes, hypertension and hyperlipidemia who presents today for Echo follow-up.     Patient states that she has a few questions about what the meaning of her echo shows.  She denies any chest pain, shortness of breath, headaches or vision changes.    She states that she is getting get her mammogram as well as her Pap smear in May.  She did not know that she was due for her colonoscopy.  She is on a 5-year recall.  She is on a 5-year recall because of a questionable family history of colon cancer.  Patient states that her father had kidney cancer that then metastasized to his colon.      ROS:    Negative expect for pertinent positives listed in the HPI        Vitals:    04/08/24 0826   BP: 130/70   Site: Left Upper Arm   Position: Sitting   Cuff Size: Large Adult   Pulse: 71   Resp: 18   Temp: 97.1 °F (36.2 °C)   TempSrc: Oral   SpO2: 96%   Weight: 114.4 kg (252 lb 3.2 oz)   Height: 1.753 m (5' 9\")       Allergies:  Zinc, Codeine, and Adhesive tape    Outpatient Medications Marked as Taking for the 4/8/24 encounter (Office Visit) with Priyanka Santizo, DO   Medication Sig Dispense Refill    atorvastatin (LIPITOR) 40 MG tablet Take 1 tablet by mouth nightly 90 tablet 1    losartan (COZAAR) 25 MG tablet Take 1 tablet by mouth daily 90 tablet 1         Objective:     Constitutional:   Reviewed vitals above  Well Nourished, well developed, no distress       HENT:  Normal external nose without lesions  Resp:  Normal effort  Clear to auscultation bilaterally without rhonchi, wheezing or crackles  Cardiovascular:  On

## 2024-04-08 NOTE — PATIENT INSTRUCTIONS
Call central scheduling for mammogram:   [phone]     Call GI doctor to discuss when next colonoscopy is due

## 2024-06-03 ENCOUNTER — HOSPITAL ENCOUNTER (OUTPATIENT)
Dept: WOMENS IMAGING | Age: 59
Discharge: HOME | End: 2024-06-03
Payer: COMMERCIAL

## 2024-06-03 VITALS — HEIGHT: 69 IN | BODY MASS INDEX: 37.03 KG/M2 | WEIGHT: 250 LBS

## 2024-06-03 DIAGNOSIS — Z12.31 ENCOUNTER FOR SCREENING MAMMOGRAM FOR MALIGNANT NEOPLASM OF BREAST: ICD-10-CM

## 2024-06-03 PROCEDURE — 77063 BREAST TOMOSYNTHESIS BI: CPT

## 2024-10-16 DIAGNOSIS — I10 PRIMARY HYPERTENSION: ICD-10-CM

## 2024-10-16 DIAGNOSIS — E78.2 MIXED HYPERLIPIDEMIA: ICD-10-CM

## 2024-10-16 RX ORDER — LOSARTAN POTASSIUM 25 MG/1
25 TABLET ORAL DAILY
Qty: 90 TABLET | Refills: 0 | Status: SHIPPED | OUTPATIENT
Start: 2024-10-16

## 2024-10-16 RX ORDER — ATORVASTATIN CALCIUM 40 MG/1
40 TABLET, FILM COATED ORAL NIGHTLY
Qty: 90 TABLET | Refills: 0 | Status: SHIPPED | OUTPATIENT
Start: 2024-10-16

## 2024-10-16 NOTE — TELEPHONE ENCOUNTER
Refill Request       Last Seen: Last Seen Department: 4/8/2024  Last Seen by PCP: Visit date not found    Last Written: atorvastatin (LIPITOR) 40 MG tablet -3/11/24 90 1 refill   losartan (COZAAR) 25 MG tablet -3/11/24 90 1 refill   Next Appointment:   Future Appointments   Date Time Provider Department Center   11/5/2024  9:20 AM Vivek Alatorre, DO [facility]             Requested Prescriptions     Pending Prescriptions Disp Refills    atorvastatin (LIPITOR) 40 MG tablet 90 tablet 1     Sig: Take 1 tablet by mouth nightly    losartan (COZAAR) 25 MG tablet 90 tablet 1     Sig: Take 1 tablet by mouth daily

## 2024-10-16 NOTE — TELEPHONE ENCOUNTER
Patient is calling requesting a refill of atorvastatin (LIPITOR) 40 MG tablet  and   losartan (COZAAR) 25 MG tablet please send to [facility]  patients last OV 04.08.24 and next OV 11.05.24.     Patient took her last pill today.     please advise patient once medication is sent

## 2024-11-05 ENCOUNTER — HOSPITAL ENCOUNTER (OUTPATIENT)
Age: 59
Discharge: HOME | End: 2024-11-05
Payer: COMMERCIAL

## 2024-11-05 ENCOUNTER — OFFICE VISIT (OUTPATIENT)
Dept: PRIMARY CARE CLINIC | Age: 59
End: 2024-11-05

## 2024-11-05 VITALS
WEIGHT: 253.6 LBS | OXYGEN SATURATION: 97 % | DIASTOLIC BLOOD PRESSURE: 78 MMHG | BODY MASS INDEX: 37.45 KG/M2 | HEART RATE: 75 BPM | SYSTOLIC BLOOD PRESSURE: 138 MMHG

## 2024-11-05 DIAGNOSIS — E78.2 MIXED HYPERLIPIDEMIA: ICD-10-CM

## 2024-11-05 DIAGNOSIS — R73.03 PRE-DIABETES: ICD-10-CM

## 2024-11-05 DIAGNOSIS — I10 PRIMARY HYPERTENSION: ICD-10-CM

## 2024-11-05 DIAGNOSIS — Z00.00 HEALTHCARE MAINTENANCE: ICD-10-CM

## 2024-11-05 DIAGNOSIS — E66.9 OBESITY (BMI 30-39.9): ICD-10-CM

## 2024-11-05 DIAGNOSIS — R01.1 SYSTOLIC MURMUR: ICD-10-CM

## 2024-11-05 DIAGNOSIS — Z12.4 CERVICAL CANCER SCREENING: ICD-10-CM

## 2024-11-05 DIAGNOSIS — Z12.11 SCREEN FOR COLON CANCER: ICD-10-CM

## 2024-11-05 DIAGNOSIS — C44.519 BASAL CELL CARCINOMA (BCC) OF BACK: ICD-10-CM

## 2024-11-05 DIAGNOSIS — I10 PRIMARY HYPERTENSION: Primary | ICD-10-CM

## 2024-11-05 LAB
ANION GAP SERPL CALCULATED.3IONS-SCNC: 12 MMOL/L (ref 3–16)
BUN SERPL-MCNC: 11 MG/DL (ref 7–20)
CALCIUM SERPL-MCNC: 9.8 MG/DL (ref 8.3–10.6)
CHLORIDE SERPL-SCNC: 100 MMOL/L (ref 99–110)
CHOLEST SERPL-MCNC: 141 MG/DL (ref 0–199)
CO2 SERPL-SCNC: 24 MMOL/L (ref 21–32)
CREAT SERPL-MCNC: 0.7 MG/DL (ref 0.6–1.1)
CREAT UR-MCNC: 74.9 MG/DL (ref 28–259)
EST. AVERAGE GLUCOSE BLD GHB EST-MCNC: 122.6 MG/DL
GFR SERPLBLD CREATININE-BSD FMLA CKD-EPI: >90 ML/MIN/{1.73_M2}
GLUCOSE SERPL-MCNC: 114 MG/DL (ref 70–99)
HBA1C MFR BLD: 5.9 %
HDLC SERPL-MCNC: 58 MG/DL (ref 40–60)
LDLC SERPL CALC-MCNC: 65 MG/DL
MICROALBUMIN UR DL<=1MG/L-MCNC: <1.2 MG/DL
MICROALBUMIN/CREAT UR: NORMAL MG/G (ref 0–30)
POTASSIUM SERPL-SCNC: 3.9 MMOL/L (ref 3.5–5.1)
SODIUM SERPL-SCNC: 136 MMOL/L (ref 136–145)
TRIGL SERPL-MCNC: 89 MG/DL (ref 0–150)
VLDLC SERPL CALC-MCNC: 18 MG/DL

## 2024-11-05 PROCEDURE — 82570 ASSAY OF URINE CREATININE: CPT

## 2024-11-05 PROCEDURE — 36415 COLL VENOUS BLD VENIPUNCTURE: CPT

## 2024-11-05 PROCEDURE — 83036 HEMOGLOBIN GLYCOSYLATED A1C: CPT

## 2024-11-05 PROCEDURE — 82043 UR ALBUMIN QUANTITATIVE: CPT

## 2024-11-05 PROCEDURE — 80048 BASIC METABOLIC PNL TOTAL CA: CPT

## 2024-11-05 PROCEDURE — 80061 LIPID PANEL: CPT

## 2024-11-05 NOTE — PATIENT INSTRUCTIONS
Referral information for [facility] (gastroenterologists):  - Address: [facility] [address], [place] [address]  - Office Hours: Monday - Friday: 9 a.m. - 4:30 p.m.  - Phone: [phone]  - Fax: [phone]       Referral information for OB/Gyn:  [facility] Obstetrics [facility] Gynecology  [address] [place] [address]   Phone:[phone]

## 2024-11-05 NOTE — PROGRESS NOTES
[facility]                               Family and Community Medicine Residency Practice                                             [address], [address], [place] [address]        Phone: [phone]      Date of Service:  11/5/2024  Patient ID: Yakelin Pérez is a 59 y.o. female    ASSESSMENT / PLAN:     HTN. Controlled, continue losartan 25 mg qd. Will check BMP and uACR and follow up results.   HLD. Unclear if controlled, will check lipid panel. For now we will continue Lipitor 40 mg qd.   Prediabetes. Will check A1c and follow up results. Continue with lifestyle modifications for now.    Obesity. Not at goal. Patient defers anti-obesity medications today.   Systolic murmur. Controlled. Will continue to monitor for surveillance. Continue losartan 25 mg qd for BP control.    Basal cell carcinoma of back. Resolved since mohs surgery, continue following with dermatology.     Healthcare maintenance:  - Colon cancer screening - referral placed to GI for colon cancer screening colonoscopy, scheduling instructions given to patient.   - Cervical cancer screening - patient elects to follow-up with her OB-Gyn provider for cervical cancer screening. Referral placed to obgyn.     Follow up plan:   Return in about 3 months (around 2/5/2025).     Vivek Alatorre DO  Date of encounter: 11/5/2024    SUBJECTIVE:   Yakelin Pérez is a 59 y.o. female who  has a past medical history of Bursitis, Chest pain, colonoscopy, Dizziness, Dyspnea, GERD (gastroesophageal reflux disease), Hypertension, and Lipoma of left lower extremity.     Chief complaint: meds and labs checkup     HTN. Taking losartan 25 mg qd.  HLD. Taking lipitor 40 mg qd.   Prediabetes. Last A1c 5.9% in march 2024.   Obesity. Not at goal. Discussed medications - patient not interested.    Murmur. Denies syncope, angina, dyspnea.   Echo April 2024 obtained revealed trace regurgitation in the aortic valve, tricuspid

## 2025-01-11 DIAGNOSIS — E78.2 MIXED HYPERLIPIDEMIA: ICD-10-CM

## 2025-01-11 DIAGNOSIS — I10 PRIMARY HYPERTENSION: ICD-10-CM

## 2025-01-13 RX ORDER — ATORVASTATIN CALCIUM 40 MG/1
40 TABLET, FILM COATED ORAL NIGHTLY
Qty: 90 TABLET | Refills: 0 | Status: SHIPPED | OUTPATIENT
Start: 2025-01-13

## 2025-01-13 RX ORDER — LOSARTAN POTASSIUM 25 MG/1
25 TABLET ORAL DAILY
Qty: 90 TABLET | Refills: 0 | Status: SHIPPED | OUTPATIENT
Start: 2025-01-13

## 2025-01-13 NOTE — TELEPHONE ENCOUNTER
Refill Request     Last Seen: 11/5/2024    Last Written: 10/13/24    Next Appointment:   Future Appointments   Date Time Provider Department Center   3/10/2025  9:00 AM Vivek Alatorre DO [facility]             Requested Prescriptions     Pending Prescriptions Disp Refills    atorvastatin (LIPITOR) 40 MG tablet [Pharmacy Med Name: ATORVASTATIN 40MG TABLETS] 90 tablet 0     Sig: TAKE 1 TABLET BY MOUTH EVERY NIGHT    losartan (COZAAR) 25 MG tablet [Pharmacy Med Name: LOSARTAN 25MG TABLETS] 90 tablet 0     Sig: TAKE 1 TABLET BY MOUTH DAILY

## 2025-01-30 ENCOUNTER — OFFICE VISIT (OUTPATIENT)
Dept: ENT CLINIC | Age: 60
End: 2025-01-30
Payer: COMMERCIAL

## 2025-01-30 VITALS
HEART RATE: 86 BPM | BODY MASS INDEX: 37.47 KG/M2 | DIASTOLIC BLOOD PRESSURE: 96 MMHG | SYSTOLIC BLOOD PRESSURE: 154 MMHG | WEIGHT: 253 LBS | HEIGHT: 69 IN

## 2025-01-30 DIAGNOSIS — R22.0 GINGIVAL SWELLING: Primary | ICD-10-CM

## 2025-01-30 PROCEDURE — G8427 DOCREV CUR MEDS BY ELIG CLIN: HCPCS | Performed by: OTOLARYNGOLOGY

## 2025-01-30 PROCEDURE — 99213 OFFICE O/P EST LOW 20 MIN: CPT | Performed by: OTOLARYNGOLOGY

## 2025-01-30 PROCEDURE — 1036F TOBACCO NON-USER: CPT | Performed by: OTOLARYNGOLOGY

## 2025-01-30 PROCEDURE — 3017F COLORECTAL CA SCREEN DOC REV: CPT | Performed by: OTOLARYNGOLOGY

## 2025-01-30 PROCEDURE — 3077F SYST BP >= 140 MM HG: CPT | Performed by: OTOLARYNGOLOGY

## 2025-01-30 PROCEDURE — G8417 CALC BMI ABV UP PARAM F/U: HCPCS | Performed by: OTOLARYNGOLOGY

## 2025-01-30 PROCEDURE — 3080F DIAST BP >= 90 MM HG: CPT | Performed by: OTOLARYNGOLOGY

## 2025-01-30 ASSESSMENT — ENCOUNTER SYMPTOMS
FACIAL SWELLING: 0
TROUBLE SWALLOWING: 0
APNEA: 0
COUGH: 0
SINUS PRESSURE: 0
VOICE CHANGE: 0
SHORTNESS OF BREATH: 0
EYE ITCHING: 0
SORE THROAT: 0

## 2025-01-30 NOTE — PROGRESS NOTES
[facility]  [address], [address]  [place] [address]  P: [phone]       Patient     Yakelin Pérez  1965    ChiefComplaint     Chief Complaint   Patient presents with    Other     Mouth burning @Sept worse with hot foods, and brushing teeth.  Gums bleeding started Nov.       History of Present Illness     Yakelin is a 59-year-old female here today for oral cavity pain.  Notes over the summer developed burning mouth.  Then in October November developed severe inflammation and pain of her gingiva.  Has changed toothbrush paste since 9 but reports pain becomes so severe that she cries during brushing of teeth.  Seen by dentist who noticed no evidence of gingivitis.    Past Medical History     Past Medical History:   Diagnosis Date    Bursitis     Chest pain 06/13/2022    colonoscopy 03/06/2018    Dizziness 6/13/2022    Dyspnea     GERD (gastroesophageal reflux disease)     Hypertension     Lipoma of left lower extremity     Left hip lipoma removed       Past Surgical History     Past Surgical History:   Procedure Laterality Date    CHOLECYSTECTOMY      COLONOSCOPY  03/06/2018       Family History     Family History   Problem Relation Age of Onset    Diabetes Father     Cancer Father     Colon Cancer Father     Mental Retardation Sister     Diabetes Sister     High Blood Pressure Brother     Diabetes Sister        Social History     Social History     Tobacco Use    Smoking status: Never    Smokeless tobacco: Never   Vaping Use    Vaping status: Never Used   Substance Use Topics    Alcohol use: No    Drug use: No        Allergies     Allergies   Allergen Reactions    Zinc Nausea And Vomiting    Codeine Hives    Adhesive Tape Other (See Comments)     Adhesives from EKG left very red        Medications     Current Outpatient Medications   Medication Sig Dispense Refill    atorvastatin (LIPITOR) 40 MG tablet TAKE 1 TABLET BY MOUTH EVERY NIGHT 90 tablet 0    losartan (COZAAR) 25 MG tablet TAKE

## 2025-03-07 SDOH — ECONOMIC STABILITY: FOOD INSECURITY: WITHIN THE PAST 12 MONTHS, YOU WORRIED THAT YOUR FOOD WOULD RUN OUT BEFORE YOU GOT MONEY TO BUY MORE.: NEVER TRUE

## 2025-03-07 SDOH — ECONOMIC STABILITY: FOOD INSECURITY: WITHIN THE PAST 12 MONTHS, THE FOOD YOU BOUGHT JUST DIDN'T LAST AND YOU DIDN'T HAVE MONEY TO GET MORE.: NEVER TRUE

## 2025-03-07 SDOH — ECONOMIC STABILITY: INCOME INSECURITY: IN THE LAST 12 MONTHS, WAS THERE A TIME WHEN YOU WERE NOT ABLE TO PAY THE MORTGAGE OR RENT ON TIME?: NO

## 2025-03-08 NOTE — PROGRESS NOTES
Yakelin Pérez (:  1965) is a 60 y.o. female,Established patient, here for evaluation of the following chief complaint(s):  Follow-up Chronic Condition (4 month f/u )      Assessment & Plan  Heart murmur   Chronic, not at goal (unstable), recommended starting Jardiance but patient declines at this time, she was given handout to read more about it.  She will reach out on MyChart if she decides to change her mind and start Jardiance to reduce the risk of progression to heart failure and reduce increased morbidity and mortality and hospitalizations with this medicine.         Diastolic dysfunction    See above         Oral lichen planus   Chronic, not at goal (unstable), continue following with dermatology, awaiting biopsy results, continue oral dexamethasone as prescribed by dermatology.         Healthcare maintenance    Referral placed to OB/GYN for cervical cancer screen, referral placed to GI for colonoscopy for colon cancer screening.  Scheduling instructions provided to patient.    Orders:    Amb External Referral To Gastroenterology    Ambulatory referral to Obstetrics / Gynecology    No follow-ups on file.    Subjective           Murmur, diastolic dysfunction  Felicity - has had echo in . Grade 1 diastolic dysfunction.   Murmur due to multiple trace/mild valvular dysfunctions.  Trace aortic valve regurgitation.  Trace tricuspid regurgitation.  Discussed starting jardiance - MoA and SE's of medicine and indication. She declines today but wants handout of med.     Oral lichen planus  Gums were really red and inflamed, starting around . Went to an original dentist, then ENT with Dr Wilcox, who sent her to Dr Hernan Givens  - got biopsy recently, still pending results. She has oral dexamethasone cream that she swishes then spits from her mouth.     Healthcare maintenance   Cervical cancer screen - needs to see a women obgyn, Is okay with referral to obgyn [facility]  Colorectal cancer screen -

## 2025-03-10 ENCOUNTER — OFFICE VISIT (OUTPATIENT)
Dept: PRIMARY CARE CLINIC | Age: 60
End: 2025-03-10
Payer: COMMERCIAL

## 2025-03-10 VITALS
DIASTOLIC BLOOD PRESSURE: 70 MMHG | TEMPERATURE: 97.8 F | OXYGEN SATURATION: 97 % | BODY MASS INDEX: 37.15 KG/M2 | SYSTOLIC BLOOD PRESSURE: 130 MMHG | HEART RATE: 63 BPM | WEIGHT: 251.6 LBS

## 2025-03-10 DIAGNOSIS — L43.8 ORAL LICHEN PLANUS: ICD-10-CM

## 2025-03-10 DIAGNOSIS — Z00.00 HEALTHCARE MAINTENANCE: ICD-10-CM

## 2025-03-10 DIAGNOSIS — I51.89 DIASTOLIC DYSFUNCTION: ICD-10-CM

## 2025-03-10 DIAGNOSIS — R01.1 HEART MURMUR: Primary | ICD-10-CM

## 2025-03-10 PROCEDURE — G8417 CALC BMI ABV UP PARAM F/U: HCPCS

## 2025-03-10 PROCEDURE — G8427 DOCREV CUR MEDS BY ELIG CLIN: HCPCS

## 2025-03-10 PROCEDURE — 3075F SYST BP GE 130 - 139MM HG: CPT

## 2025-03-10 PROCEDURE — 1036F TOBACCO NON-USER: CPT

## 2025-03-10 PROCEDURE — 99213 OFFICE O/P EST LOW 20 MIN: CPT

## 2025-03-10 PROCEDURE — 3017F COLORECTAL CA SCREEN DOC REV: CPT

## 2025-03-10 PROCEDURE — 3078F DIAST BP <80 MM HG: CPT

## 2025-03-10 RX ORDER — MINOCYCLINE HYDROCHLORIDE 100 MG/1
100 TABLET ORAL 2 TIMES DAILY
COMMUNITY
Start: 2025-03-07

## 2025-03-10 RX ORDER — DEXAMETHASONE 0.5 MG/5ML
ELIXIR ORAL
COMMUNITY
Start: 2025-03-07

## 2025-03-10 RX ORDER — CLOTRIMAZOLE AND BETAMETHASONE DIPROPIONATE 10; .64 MG/G; MG/G
CREAM TOPICAL
COMMUNITY
Start: 2025-03-07

## 2025-03-10 ASSESSMENT — PATIENT HEALTH QUESTIONNAIRE - PHQ9
1. LITTLE INTEREST OR PLEASURE IN DOING THINGS: NOT AT ALL
SUM OF ALL RESPONSES TO PHQ QUESTIONS 1-9: 0
2. FEELING DOWN, DEPRESSED OR HOPELESS: NOT AT ALL

## 2025-03-10 NOTE — PATIENT INSTRUCTIONS
Referral information for OB/Gyn:  [facility]  [address] [place] [address]   Phone:[phone]     Referral information for gastroenterology:  [facility] [id number] [place]  [address]  Ph: [phone]

## 2025-03-10 NOTE — ASSESSMENT & PLAN NOTE
Chronic, not at goal (unstable), continue following with dermatology, awaiting biopsy results, continue oral dexamethasone as prescribed by dermatology.

## 2025-03-10 NOTE — ASSESSMENT & PLAN NOTE
Chronic, not at goal (unstable), recommended starting Jardiance but patient declines at this time, she was given handout to read more about it.  She will reach out on MyChart if she decides to change her mind and start Jardiance to reduce the risk of progression to heart failure and reduce increased morbidity and mortality and hospitalizations with this medicine.

## 2025-04-08 ENCOUNTER — OFFICE VISIT (OUTPATIENT)
Dept: PRIMARY CARE CLINIC | Age: 60
End: 2025-04-08

## 2025-04-08 VITALS
SYSTOLIC BLOOD PRESSURE: 136 MMHG | HEART RATE: 76 BPM | TEMPERATURE: 97.3 F | DIASTOLIC BLOOD PRESSURE: 88 MMHG | OXYGEN SATURATION: 98 % | BODY MASS INDEX: 37.39 KG/M2 | WEIGHT: 253.2 LBS

## 2025-04-08 DIAGNOSIS — Z12.11 SCREEN FOR COLON CANCER: ICD-10-CM

## 2025-04-08 DIAGNOSIS — R19.7 DIARRHEA OF PRESUMED INFECTIOUS ORIGIN: Primary | ICD-10-CM

## 2025-04-08 NOTE — PATIENT INSTRUCTIONS
Referral information for gastroenterology:  [facility]  [address] [place]  [address]  Ph: [phone]      Clear

## 2025-04-08 NOTE — PROGRESS NOTES
Yakelin Pérez (:  1965) is a 60 y.o. female,Established patient, here for evaluation of the following chief complaint(s):  Diarrhea (X 14 days)      Assessment & Plan  Diarrhea of presumed infectious origin    Controlled, symptoms are improving with stools becoming more solid.  I reassured patient.  Continue with good fluid hydration, drink 2 to 3 L of water daily with electrolytes.  Return if symptoms not improving or worsening over the next 1 to 2 weeks we will consider stool evaluation.         Screen for colon cancer    Referral placed to GI for colon cancer screening colonoscopy.    Orders:    Amb External Referral To Gastroenterology      No follow-ups on file.    Subjective       Patient presents with:  Diarrhea: X 14 days      Her and her daughter had sushi  Her daughter was diagnosed with e coli  Sx are different  Patient having feeling queasy but no abdominal pain, feels she might get sick.   Having diarrhea, no hematochezia.   They ate  and hers started that night  Feels it may be improving  But not solid like it should be  No fevers but feels light headed the last couple of days, but has hx of vertigo a couple of years ago   The daughter e coli positive test was an anal swab, not a stool test  Drinking water all day long, staying well hydrated       HCM  Getting pap smear Thursday, with [facility]   Needs colonoscopy       Review of Systems   All other systems reviewed and are negative.              Objective     /88 (BP Site: Left Upper Arm, Patient Position: Sitting, BP Cuff Size: Large Adult)   Pulse 76   Temp 97.3 °F (36.3 °C) (Temporal)   Wt 114.9 kg (253 lb 3.2 oz)   LMP 2018 (Exact Date)   SpO2 98%   BMI 37.39 kg/m²      Physical Exam  Constitutional:       General: She is not in acute distress.     Appearance: She is well-developed.   HENT:      Head: Normocephalic.   Eyes:      General:         Right eye: No discharge.         Left eye: No

## 2025-04-14 DIAGNOSIS — E78.2 MIXED HYPERLIPIDEMIA: ICD-10-CM

## 2025-04-14 DIAGNOSIS — I10 PRIMARY HYPERTENSION: ICD-10-CM

## 2025-04-14 RX ORDER — ATORVASTATIN CALCIUM 40 MG/1
40 TABLET, FILM COATED ORAL NIGHTLY
Qty: 90 TABLET | Refills: 1 | Status: SHIPPED | OUTPATIENT
Start: 2025-04-14

## 2025-04-14 RX ORDER — LOSARTAN POTASSIUM 25 MG/1
25 TABLET ORAL DAILY
Qty: 90 TABLET | Refills: 1 | Status: SHIPPED | OUTPATIENT
Start: 2025-04-14

## 2025-04-14 NOTE — TELEPHONE ENCOUNTER
Pharmacy is calling requesting a refill of   atorvastatin (LIPITOR) 40 MG tablet   losartan (COZAAR) 25 MG tablet   Please advise patient once medication is sent in   [facility] [phone] ([place])

## 2025-04-14 NOTE — TELEPHONE ENCOUNTER
Refill Request       Last Seen: Last Seen Department: 4/8/2025  Last Seen by PCP: 4/8/2025    Last Written: atorvastatin (LIPITOR) 40 MG tablet -1/13/25 90 0 refills   losartan (COZAAR) 25 MG tablet -1/13/25 90 0 refills  Next Appointment:   No future appointments.          Requested Prescriptions     Pending Prescriptions Disp Refills    atorvastatin (LIPITOR) 40 MG tablet 90 tablet 0     Sig: Take 1 tablet by mouth nightly    losartan (COZAAR) 25 MG tablet 90 tablet 0     Sig: Take 1 tablet by mouth daily

## 2025-06-10 ENCOUNTER — RESULTS FOLLOW-UP (OUTPATIENT)
Dept: PRIMARY CARE CLINIC | Age: 60
End: 2025-06-10

## 2025-06-10 ENCOUNTER — HOSPITAL ENCOUNTER (OUTPATIENT)
Dept: WOMENS IMAGING | Age: 60
Discharge: HOME | End: 2025-06-10
Payer: COMMERCIAL

## 2025-06-10 VITALS — WEIGHT: 250 LBS | HEIGHT: 69 IN | BODY MASS INDEX: 37.03 KG/M2

## 2025-06-10 DIAGNOSIS — Z12.31 SCREENING MAMMOGRAM, ENCOUNTER FOR: ICD-10-CM

## 2025-06-10 PROCEDURE — 77063 BREAST TOMOSYNTHESIS BI: CPT

## 2025-08-28 ENCOUNTER — HOSPITAL ENCOUNTER (EMERGENCY)
Age: 60
Discharge: HOME | End: 2025-08-28
Attending: EMERGENCY MEDICINE
Payer: COMMERCIAL

## 2025-08-28 VITALS
BODY MASS INDEX: 36.95 KG/M2 | HEIGHT: 69 IN | OXYGEN SATURATION: 99 % | DIASTOLIC BLOOD PRESSURE: 76 MMHG | WEIGHT: 249.5 LBS | TEMPERATURE: 98.3 F | HEART RATE: 65 BPM | SYSTOLIC BLOOD PRESSURE: 154 MMHG | RESPIRATION RATE: 21 BRPM

## 2025-08-28 DIAGNOSIS — R79.89 ABNORMAL LFTS: ICD-10-CM

## 2025-08-28 DIAGNOSIS — G51.0 BELL'S PALSY: Primary | ICD-10-CM

## 2025-08-28 LAB
ALBUMIN SERPL-MCNC: 4.9 G/DL (ref 3.4–5)
ALBUMIN/GLOB SERPL: 1.3 {RATIO} (ref 1.1–2.2)
ALP SERPL-CCNC: 210 U/L (ref 40–129)
ALT SERPL-CCNC: 165 U/L (ref 10–40)
ANION GAP SERPL CALCULATED.3IONS-SCNC: 14 MMOL/L (ref 3–16)
AST SERPL-CCNC: 119 U/L (ref 15–37)
BASOPHILS # BLD: 0.1 K/UL (ref 0–0.2)
BASOPHILS NFR BLD: 0.6 %
BILIRUB SERPL-MCNC: 1 MG/DL (ref 0–1)
BUN SERPL-MCNC: 9 MG/DL (ref 7–20)
CALCIUM SERPL-MCNC: 10.2 MG/DL (ref 8.3–10.6)
CHLORIDE SERPL-SCNC: 102 MMOL/L (ref 99–110)
CO2 SERPL-SCNC: 24 MMOL/L (ref 21–32)
CREAT SERPL-MCNC: 0.7 MG/DL (ref 0.6–1.1)
DEPRECATED RDW RBC AUTO: 13.4 % (ref 12.4–15.4)
EOSINOPHIL # BLD: 0.4 K/UL (ref 0–0.6)
EOSINOPHIL NFR BLD: 4.6 %
GFR SERPLBLD CREATININE-BSD FMLA CKD-EPI: >90 ML/MIN/{1.73_M2}
GLUCOSE BLD-MCNC: 88 MG/DL (ref 70–99)
GLUCOSE SERPL-MCNC: 95 MG/DL (ref 70–99)
HCT VFR BLD AUTO: 40.1 % (ref 36–48)
HGB BLD-MCNC: 13 G/DL (ref 12–16)
LYMPHOCYTES # BLD: 2.5 K/UL (ref 1–5.1)
LYMPHOCYTES NFR BLD: 28.4 %
MCH RBC QN AUTO: 29.1 PG (ref 26–34)
MCHC RBC AUTO-ENTMCNC: 32.5 G/DL (ref 31–36)
MCV RBC AUTO: 89.6 FL (ref 80–100)
MONOCYTES # BLD: 0.4 K/UL (ref 0–1.3)
MONOCYTES NFR BLD: 5.2 %
NEUTROPHILS # BLD: 5.3 K/UL (ref 1.7–7.7)
NEUTROPHILS NFR BLD: 61.2 %
PERFORMED ON: NORMAL
PLATELET # BLD AUTO: 317 K/UL (ref 135–450)
PMV BLD AUTO: 8.2 FL (ref 5–10.5)
POTASSIUM SERPL-SCNC: 4 MMOL/L (ref 3.5–5.1)
PROT SERPL-MCNC: 8.8 G/DL (ref 6.4–8.2)
RBC # BLD AUTO: 4.47 M/UL (ref 4–5.2)
SODIUM SERPL-SCNC: 140 MMOL/L (ref 136–145)
WBC # BLD AUTO: 8.7 K/UL (ref 4–11)

## 2025-08-28 PROCEDURE — 99283 EMERGENCY DEPT VISIT LOW MDM: CPT

## 2025-08-28 PROCEDURE — 85025 COMPLETE CBC W/AUTO DIFF WBC: CPT

## 2025-08-28 PROCEDURE — 80053 COMPREHEN METABOLIC PANEL: CPT

## 2025-08-28 PROCEDURE — 6370000000 HC RX 637 (ALT 250 FOR IP): Performed by: EMERGENCY MEDICINE

## 2025-08-28 RX ORDER — PREDNISONE 20 MG/1
60 TABLET ORAL DAILY
Qty: 18 TABLET | Refills: 0 | Status: SHIPPED | OUTPATIENT
Start: 2025-08-28 | End: 2025-09-03

## 2025-08-28 RX ORDER — PREDNISONE 20 MG/1
60 TABLET ORAL ONCE
Status: COMPLETED | OUTPATIENT
Start: 2025-08-28 | End: 2025-08-28

## 2025-08-28 RX ORDER — VALACYCLOVIR HYDROCHLORIDE 1 G/1
1000 TABLET, FILM COATED ORAL 3 TIMES DAILY
Qty: 21 TABLET | Refills: 0 | Status: SHIPPED | OUTPATIENT
Start: 2025-08-28 | End: 2025-09-04

## 2025-08-28 RX ADMIN — PREDNISONE 60 MG: 20 TABLET ORAL at 17:29

## 2025-08-28 ASSESSMENT — LIFESTYLE VARIABLES
HOW OFTEN DO YOU HAVE A DRINK CONTAINING ALCOHOL: NEVER
HOW MANY STANDARD DRINKS CONTAINING ALCOHOL DO YOU HAVE ON A TYPICAL DAY: PATIENT DOES NOT DRINK

## 2025-08-28 ASSESSMENT — PAIN SCALES - GENERAL: PAINLEVEL_OUTOF10: 0

## 2025-08-28 ASSESSMENT — PAIN - FUNCTIONAL ASSESSMENT: PAIN_FUNCTIONAL_ASSESSMENT: 0-10

## 2025-09-03 ENCOUNTER — OFFICE VISIT (OUTPATIENT)
Dept: PRIMARY CARE CLINIC | Age: 60
End: 2025-09-03

## 2025-09-03 VITALS
SYSTOLIC BLOOD PRESSURE: 130 MMHG | HEART RATE: 76 BPM | DIASTOLIC BLOOD PRESSURE: 72 MMHG | TEMPERATURE: 98.4 F | WEIGHT: 250 LBS | BODY MASS INDEX: 36.92 KG/M2 | OXYGEN SATURATION: 94 %

## 2025-09-03 DIAGNOSIS — R79.89 ELEVATED LFTS: Primary | ICD-10-CM

## 2025-09-03 DIAGNOSIS — B02.21 RAMSAY HUNT SYNDROME (GENICULATE HERPES ZOSTER): ICD-10-CM

## 2025-09-03 RX ORDER — VALACYCLOVIR HYDROCHLORIDE 1 G/1
1000 TABLET, FILM COATED ORAL 3 TIMES DAILY
Qty: 9 TABLET | Refills: 0 | Status: SHIPPED | OUTPATIENT
Start: 2025-09-03 | End: 2025-09-06

## 2025-09-03 RX ORDER — PREDNISONE 20 MG/1
TABLET ORAL
Qty: 32 TABLET | Refills: 0 | Status: SHIPPED | OUTPATIENT
Start: 2025-09-03 | End: 2025-09-21

## 2025-09-03 RX ORDER — KETOCONAZOLE 20 MG/G
CREAM TOPICAL PRN
COMMUNITY
Start: 2025-06-02